# Patient Record
Sex: MALE | Race: WHITE | Employment: PART TIME | URBAN - METROPOLITAN AREA
[De-identification: names, ages, dates, MRNs, and addresses within clinical notes are randomized per-mention and may not be internally consistent; named-entity substitution may affect disease eponyms.]

---
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
## 2021-11-27 ENCOUNTER — HOSPITAL ENCOUNTER (EMERGENCY)
Age: 21
Discharge: HOME | End: 2021-11-27
Payer: MEDICARE

## 2021-11-27 VITALS
HEIGHT: 72 IN | WEIGHT: 223 LBS | TEMPERATURE: 97.9 F | OXYGEN SATURATION: 99 % | SYSTOLIC BLOOD PRESSURE: 135 MMHG | RESPIRATION RATE: 18 BRPM | DIASTOLIC BLOOD PRESSURE: 78 MMHG | BODY MASS INDEX: 30.2 KG/M2 | HEART RATE: 80 BPM

## 2021-11-27 DIAGNOSIS — L05.91 PILONIDAL CYST: Primary | ICD-10-CM

## 2021-11-27 PROCEDURE — 10080 I&D PILONIDAL CYST SIMPLE: CPT

## 2021-11-27 PROCEDURE — 6360000002 HC RX W HCPCS: Performed by: NURSE PRACTITIONER

## 2021-11-27 PROCEDURE — 90471 IMMUNIZATION ADMIN: CPT | Performed by: NURSE PRACTITIONER

## 2021-11-27 PROCEDURE — 90715 TDAP VACCINE 7 YRS/> IM: CPT | Performed by: NURSE PRACTITIONER

## 2021-11-27 PROCEDURE — 99283 EMERGENCY DEPT VISIT LOW MDM: CPT

## 2021-11-27 PROCEDURE — 6370000000 HC RX 637 (ALT 250 FOR IP): Performed by: NURSE PRACTITIONER

## 2021-11-27 RX ORDER — DOXYCYCLINE HYCLATE 100 MG
100 TABLET ORAL 2 TIMES DAILY
Qty: 14 TABLET | Refills: 0 | Status: SHIPPED | OUTPATIENT
Start: 2021-11-27 | End: 2021-12-04

## 2021-11-27 RX ORDER — DOXYCYCLINE HYCLATE 100 MG
100 TABLET ORAL ONCE
Status: COMPLETED | OUTPATIENT
Start: 2021-11-27 | End: 2021-11-27

## 2021-11-27 RX ADMIN — DOXYCYCLINE HYCLATE 100 MG: 100 TABLET, COATED ORAL at 23:07

## 2021-11-27 RX ADMIN — TETANUS TOXOID, REDUCED DIPHTHERIA TOXOID AND ACELLULAR PERTUSSIS VACCINE, ADSORBED 0.5 ML: 5; 2.5; 8; 8; 2.5 SUSPENSION INTRAMUSCULAR at 23:07

## 2021-11-27 ASSESSMENT — PAIN DESCRIPTION - DESCRIPTORS: DESCRIPTORS: PRESSURE;SHARP

## 2021-11-27 ASSESSMENT — ENCOUNTER SYMPTOMS
ABDOMINAL PAIN: 0
SORE THROAT: 0
COLOR CHANGE: 0
RHINORRHEA: 0
SHORTNESS OF BREATH: 0

## 2021-11-27 ASSESSMENT — PAIN DESCRIPTION - LOCATION: LOCATION: RECTUM

## 2021-11-27 ASSESSMENT — PAIN SCALES - GENERAL: PAINLEVEL_OUTOF10: 5

## 2021-11-28 NOTE — ED PROVIDER NOTES
reviewed and negative. PAST MEDICAL HISTORY     Past Medical History:   Diagnosis Date    ADHD (attention deficit hyperactivity disorder)     Pervasive developmental disorder          SURGICAL HISTORY     No past surgical history on file. CURRENT MEDICATIONS       Previous Medications    No medications on file         ALLERGIES     Patient has no known allergies. FAMILY HISTORY     No family history on file. SOCIAL HISTORY       Social History     Socioeconomic History    Marital status: Single     Spouse name: Not on file    Number of children: Not on file    Years of education: Not on file    Highest education level: Not on file   Occupational History    Not on file   Tobacco Use    Smoking status: Never Smoker    Smokeless tobacco: Never Used   Vaping Use    Vaping Use: Never used   Substance and Sexual Activity    Alcohol use: No    Drug use: Yes    Sexual activity: Not Currently   Other Topics Concern    Not on file   Social History Narrative    Not on file     Social Determinants of Health     Financial Resource Strain:     Difficulty of Paying Living Expenses: Not on file   Food Insecurity:     Worried About Running Out of Food in the Last Year: Not on file    Jj of Food in the Last Year: Not on file   Transportation Needs:     Lack of Transportation (Medical): Not on file    Lack of Transportation (Non-Medical):  Not on file   Physical Activity:     Days of Exercise per Week: Not on file    Minutes of Exercise per Session: Not on file   Stress:     Feeling of Stress : Not on file   Social Connections:     Frequency of Communication with Friends and Family: Not on file    Frequency of Social Gatherings with Friends and Family: Not on file    Attends Hinduism Services: Not on file    Active Member of Clubs or Organizations: Not on file    Attends Club or Organization Meetings: Not on file    Marital Status: Not on file   Intimate Partner Violence:     Fear follow-up with general surgery. He was encouraged to return to the ED for worsening symptoms. He was also encouraged to follow-up with his primary care doctor in the next few days. Patient was ultimately discharged with all questions answered. The patient tolerated their visit well. I have evaluated this patient. My supervising physician was available for consultation. The patient and / or the family were informed of the results of any tests, a time was given to answer questions, a plan was proposed and they agreed with plan. FINAL IMPRESSION      1.  Pilonidal cyst          DISPOSITION/PLAN   DISPOSITION Discharge - Pending Orders Complete 11/27/2021 10:27:47 PM      PATIENT REFERRED TO:  Tyrese Lugo MD  [address] [facility] [address] [place] [address],[address]  [phone]    Schedule an appointment as soon as possible for a visit in 2 days  for re-evaluation    [facility] ED  [address] [facility]  [phone]    If symptoms worsen    Ross Greer MD  [address] [facility] [address] [place] [address] [place] [address] [id number]    Schedule an appointment as soon as possible for a visit in 1 week  for re-evaluation      DISCHARGE MEDICATIONS:  New Prescriptions    DOXYCYCLINE HYCLATE (VIBRA-TABS) 100 MG TABLET    Take 1 tablet by mouth 2 times daily for 7 days       DISCONTINUED MEDICATIONS:  Discontinued Medications    No medications on file              (Please note that portions of this note were completed with a voice recognition program.  Efforts were made to edit the dictations but occasionally words are mis-transcribed.)    CAM García CNP (electronically signed)         CAM García CNP  11/27/21 9022

## 2022-02-14 ENCOUNTER — HOSPITAL ENCOUNTER (INPATIENT)
Age: 22
LOS: 1 days | Discharge: PSYCHIATRIC OR SUBSTANCE USE FACILITY | DRG: 918 | End: 2022-02-16
Attending: STUDENT IN AN ORGANIZED HEALTH CARE EDUCATION/TRAINING PROGRAM | Admitting: INTERNAL MEDICINE
Payer: MEDICARE

## 2022-02-14 DIAGNOSIS — T43.292A BUPROPION OVERDOSE, INTENTIONAL SELF-HARM, INITIAL ENCOUNTER (HCC): Primary | ICD-10-CM

## 2022-02-14 DIAGNOSIS — R45.851 SUICIDAL IDEATION: ICD-10-CM

## 2022-02-14 DIAGNOSIS — R56.9 SEIZURE (HCC): ICD-10-CM

## 2022-02-14 LAB
A/G RATIO: 1.8 (ref 1.1–2.2)
ACETAMINOPHEN LEVEL: <5 UG/ML (ref 10–30)
ALBUMIN SERPL-MCNC: 4.6 G/DL (ref 3.4–5)
ALP BLD-CCNC: 93 U/L (ref 40–129)
ALT SERPL-CCNC: 26 U/L (ref 10–40)
ANION GAP SERPL CALCULATED.3IONS-SCNC: 12 MMOL/L (ref 3–16)
AST SERPL-CCNC: 16 U/L (ref 15–37)
BASOPHILS ABSOLUTE: 0.2 K/UL (ref 0–0.2)
BASOPHILS RELATIVE PERCENT: 1.2 %
BILIRUB SERPL-MCNC: 0.9 MG/DL (ref 0–1)
BUN BLDV-MCNC: 11 MG/DL (ref 7–20)
CALCIUM SERPL-MCNC: 9 MG/DL (ref 8.3–10.6)
CHLORIDE BLD-SCNC: 103 MMOL/L (ref 99–110)
CO2: 22 MMOL/L (ref 21–32)
CREAT SERPL-MCNC: 0.9 MG/DL (ref 0.9–1.3)
EOSINOPHILS ABSOLUTE: 0.1 K/UL (ref 0–0.6)
EOSINOPHILS RELATIVE PERCENT: 1.1 %
ETHANOL: NORMAL MG/DL (ref 0–0.08)
GFR AFRICAN AMERICAN: >60
GFR NON-AFRICAN AMERICAN: >60
GLUCOSE BLD-MCNC: 136 MG/DL (ref 70–99)
HCT VFR BLD CALC: 46.5 % (ref 40.5–52.5)
HEMOGLOBIN: 15.9 G/DL (ref 13.5–17.5)
LYMPHOCYTES ABSOLUTE: 3.8 K/UL (ref 1–5.1)
LYMPHOCYTES RELATIVE PERCENT: 30.5 %
MCH RBC QN AUTO: 30.2 PG (ref 26–34)
MCHC RBC AUTO-ENTMCNC: 34.3 G/DL (ref 31–36)
MCV RBC AUTO: 88.2 FL (ref 80–100)
MONOCYTES ABSOLUTE: 0.8 K/UL (ref 0–1.3)
MONOCYTES RELATIVE PERCENT: 6.2 %
NEUTROPHILS ABSOLUTE: 7.6 K/UL (ref 1.7–7.7)
NEUTROPHILS RELATIVE PERCENT: 61 %
PDW BLD-RTO: 13.4 % (ref 12.4–15.4)
PLATELET # BLD: 270 K/UL (ref 135–450)
PMV BLD AUTO: 8.3 FL (ref 5–10.5)
POTASSIUM REFLEX MAGNESIUM: 3.7 MMOL/L (ref 3.5–5.1)
RBC # BLD: 5.27 M/UL (ref 4.2–5.9)
REASON FOR REJECTION: NORMAL
REJECTED TEST: NORMAL
SALICYLATE, SERUM: <0.3 MG/DL (ref 15–30)
SODIUM BLD-SCNC: 137 MMOL/L (ref 136–145)
TOTAL PROTEIN: 7.2 G/DL (ref 6.4–8.2)
WBC # BLD: 12.4 K/UL (ref 4–11)

## 2022-02-14 PROCEDURE — 2500000003 HC RX 250 WO HCPCS: Performed by: STUDENT IN AN ORGANIZED HEALTH CARE EDUCATION/TRAINING PROGRAM

## 2022-02-14 PROCEDURE — 96374 THER/PROPH/DIAG INJ IV PUSH: CPT

## 2022-02-14 PROCEDURE — 80179 DRUG ASSAY SALICYLATE: CPT

## 2022-02-14 PROCEDURE — 2580000003 HC RX 258: Performed by: STUDENT IN AN ORGANIZED HEALTH CARE EDUCATION/TRAINING PROGRAM

## 2022-02-14 PROCEDURE — 96361 HYDRATE IV INFUSION ADD-ON: CPT

## 2022-02-14 PROCEDURE — 82077 ASSAY SPEC XCP UR&BREATH IA: CPT

## 2022-02-14 PROCEDURE — 80143 DRUG ASSAY ACETAMINOPHEN: CPT

## 2022-02-14 PROCEDURE — 99283 EMERGENCY DEPT VISIT LOW MDM: CPT

## 2022-02-14 PROCEDURE — 6360000002 HC RX W HCPCS: Performed by: STUDENT IN AN ORGANIZED HEALTH CARE EDUCATION/TRAINING PROGRAM

## 2022-02-14 PROCEDURE — 36415 COLL VENOUS BLD VENIPUNCTURE: CPT

## 2022-02-14 PROCEDURE — 93005 ELECTROCARDIOGRAM TRACING: CPT | Performed by: STUDENT IN AN ORGANIZED HEALTH CARE EDUCATION/TRAINING PROGRAM

## 2022-02-14 PROCEDURE — 85025 COMPLETE CBC W/AUTO DIFF WBC: CPT

## 2022-02-14 PROCEDURE — 6360000002 HC RX W HCPCS

## 2022-02-14 PROCEDURE — 80053 COMPREHEN METABOLIC PANEL: CPT

## 2022-02-14 PROCEDURE — 96375 TX/PRO/DX INJ NEW DRUG ADDON: CPT

## 2022-02-14 RX ORDER — ONDANSETRON 2 MG/ML
4 INJECTION INTRAMUSCULAR; INTRAVENOUS ONCE
Status: COMPLETED | OUTPATIENT
Start: 2022-02-14 | End: 2022-02-14

## 2022-02-14 RX ORDER — 0.9 % SODIUM CHLORIDE 0.9 %
1000 INTRAVENOUS SOLUTION INTRAVENOUS ONCE
Status: COMPLETED | OUTPATIENT
Start: 2022-02-14 | End: 2022-02-14

## 2022-02-14 RX ORDER — LORAZEPAM 2 MG/ML
INJECTION INTRAMUSCULAR
Status: COMPLETED
Start: 2022-02-14 | End: 2022-02-14

## 2022-02-14 RX ADMIN — ONDANSETRON HYDROCHLORIDE 4 MG: 2 INJECTION, SOLUTION INTRAMUSCULAR; INTRAVENOUS at 21:34

## 2022-02-14 RX ADMIN — LORAZEPAM 2 MG: 2 INJECTION INTRAMUSCULAR; INTRAVENOUS at 21:34

## 2022-02-14 RX ADMIN — SODIUM BICARBONATE 25 MEQ: 84 INJECTION INTRAVENOUS at 23:07

## 2022-02-14 RX ADMIN — SODIUM CHLORIDE 1000 ML: 9 INJECTION, SOLUTION INTRAVENOUS at 21:36

## 2022-02-15 PROBLEM — T50.912A DRUG OVERDOSE, MULTIPLE DRUGS, INTENTIONAL SELF-HARM, INITIAL ENCOUNTER (HCC): Status: ACTIVE | Noted: 2022-02-15

## 2022-02-15 LAB
A/G RATIO: 1.8 (ref 1.1–2.2)
ALBUMIN SERPL-MCNC: 4.2 G/DL (ref 3.4–5)
ALP BLD-CCNC: 89 U/L (ref 40–129)
ALT SERPL-CCNC: 21 U/L (ref 10–40)
ANION GAP SERPL CALCULATED.3IONS-SCNC: 11 MMOL/L (ref 3–16)
AST SERPL-CCNC: 12 U/L (ref 15–37)
BASOPHILS ABSOLUTE: 0.1 K/UL (ref 0–0.2)
BASOPHILS RELATIVE PERCENT: 1.1 %
BILIRUB SERPL-MCNC: 1 MG/DL (ref 0–1)
BUN BLDV-MCNC: 10 MG/DL (ref 7–20)
CALCIUM SERPL-MCNC: 8.6 MG/DL (ref 8.3–10.6)
CHLORIDE BLD-SCNC: 104 MMOL/L (ref 99–110)
CO2: 23 MMOL/L (ref 21–32)
CREAT SERPL-MCNC: 0.8 MG/DL (ref 0.9–1.3)
EKG ATRIAL RATE: 99 BPM
EKG DIAGNOSIS: NORMAL
EKG P AXIS: 74 DEGREES
EKG P-R INTERVAL: 140 MS
EKG Q-T INTERVAL: 366 MS
EKG QRS DURATION: 102 MS
EKG QTC CALCULATION (BAZETT): 469 MS
EKG R AXIS: 54 DEGREES
EKG T AXIS: 23 DEGREES
EKG VENTRICULAR RATE: 99 BPM
EOSINOPHILS ABSOLUTE: 0 K/UL (ref 0–0.6)
EOSINOPHILS RELATIVE PERCENT: 0.3 %
GFR AFRICAN AMERICAN: >60
GFR NON-AFRICAN AMERICAN: >60
GLUCOSE BLD-MCNC: 106 MG/DL (ref 70–99)
HCT VFR BLD CALC: 43.2 % (ref 40.5–52.5)
HEMOGLOBIN: 14.6 G/DL (ref 13.5–17.5)
INFLUENZA A: NOT DETECTED
INFLUENZA B: NOT DETECTED
LYMPHOCYTES ABSOLUTE: 2.3 K/UL (ref 1–5.1)
LYMPHOCYTES RELATIVE PERCENT: 18.6 %
MCH RBC QN AUTO: 29.8 PG (ref 26–34)
MCHC RBC AUTO-ENTMCNC: 33.8 G/DL (ref 31–36)
MCV RBC AUTO: 88.3 FL (ref 80–100)
MONOCYTES ABSOLUTE: 0.8 K/UL (ref 0–1.3)
MONOCYTES RELATIVE PERCENT: 6.2 %
NEUTROPHILS ABSOLUTE: 9.2 K/UL (ref 1.7–7.7)
NEUTROPHILS RELATIVE PERCENT: 73.8 %
PDW BLD-RTO: 13.6 % (ref 12.4–15.4)
PLATELET # BLD: 218 K/UL (ref 135–450)
PMV BLD AUTO: 8.4 FL (ref 5–10.5)
POTASSIUM REFLEX MAGNESIUM: 3.6 MMOL/L (ref 3.5–5.1)
RBC # BLD: 4.89 M/UL (ref 4.2–5.9)
SARS-COV-2 RNA, RT PCR: NOT DETECTED
SODIUM BLD-SCNC: 138 MMOL/L (ref 136–145)
TOTAL PROTEIN: 6.6 G/DL (ref 6.4–8.2)
WBC # BLD: 12.4 K/UL (ref 4–11)

## 2022-02-15 PROCEDURE — 36415 COLL VENOUS BLD VENIPUNCTURE: CPT

## 2022-02-15 PROCEDURE — 2580000003 HC RX 258: Performed by: INTERNAL MEDICINE

## 2022-02-15 PROCEDURE — 6360000002 HC RX W HCPCS: Performed by: EMERGENCY MEDICINE

## 2022-02-15 PROCEDURE — 93010 ELECTROCARDIOGRAM REPORT: CPT | Performed by: INTERNAL MEDICINE

## 2022-02-15 PROCEDURE — 2060000000 HC ICU INTERMEDIATE R&B

## 2022-02-15 PROCEDURE — 85025 COMPLETE CBC W/AUTO DIFF WBC: CPT

## 2022-02-15 PROCEDURE — 99231 SBSQ HOSP IP/OBS SF/LOW 25: CPT

## 2022-02-15 PROCEDURE — 2580000003 HC RX 258: Performed by: EMERGENCY MEDICINE

## 2022-02-15 PROCEDURE — 87636 SARSCOV2 & INF A&B AMP PRB: CPT

## 2022-02-15 PROCEDURE — 80053 COMPREHEN METABOLIC PANEL: CPT

## 2022-02-15 PROCEDURE — 6360000002 HC RX W HCPCS: Performed by: INTERNAL MEDICINE

## 2022-02-15 PROCEDURE — 96374 THER/PROPH/DIAG INJ IV PUSH: CPT

## 2022-02-15 RX ORDER — SODIUM CHLORIDE 0.9 % (FLUSH) 0.9 %
5-40 SYRINGE (ML) INJECTION EVERY 12 HOURS SCHEDULED
Status: DISCONTINUED | OUTPATIENT
Start: 2022-02-15 | End: 2022-02-16 | Stop reason: HOSPADM

## 2022-02-15 RX ORDER — ONDANSETRON 4 MG/1
4 TABLET, ORALLY DISINTEGRATING ORAL EVERY 8 HOURS PRN
Status: DISCONTINUED | OUTPATIENT
Start: 2022-02-15 | End: 2022-02-16 | Stop reason: HOSPADM

## 2022-02-15 RX ORDER — ACETAMINOPHEN 325 MG/1
650 TABLET ORAL EVERY 6 HOURS PRN
Status: DISCONTINUED | OUTPATIENT
Start: 2022-02-15 | End: 2022-02-16 | Stop reason: HOSPADM

## 2022-02-15 RX ORDER — ACETAMINOPHEN 650 MG/1
650 SUPPOSITORY RECTAL EVERY 6 HOURS PRN
Status: DISCONTINUED | OUTPATIENT
Start: 2022-02-15 | End: 2022-02-16 | Stop reason: HOSPADM

## 2022-02-15 RX ORDER — PROCHLORPERAZINE EDISYLATE 5 MG/ML
10 INJECTION INTRAMUSCULAR; INTRAVENOUS EVERY 6 HOURS PRN
Status: DISCONTINUED | OUTPATIENT
Start: 2022-02-15 | End: 2022-02-16 | Stop reason: HOSPADM

## 2022-02-15 RX ORDER — SODIUM CHLORIDE 9 MG/ML
25 INJECTION, SOLUTION INTRAVENOUS PRN
Status: DISCONTINUED | OUTPATIENT
Start: 2022-02-15 | End: 2022-02-16 | Stop reason: HOSPADM

## 2022-02-15 RX ORDER — MAGNESIUM SULFATE IN WATER 40 MG/ML
2000 INJECTION, SOLUTION INTRAVENOUS PRN
Status: DISCONTINUED | OUTPATIENT
Start: 2022-02-15 | End: 2022-02-16 | Stop reason: HOSPADM

## 2022-02-15 RX ORDER — LORAZEPAM 2 MG/ML
2 INJECTION INTRAMUSCULAR
Status: ACTIVE | OUTPATIENT
Start: 2022-02-15 | End: 2022-02-15

## 2022-02-15 RX ORDER — SODIUM CHLORIDE 0.9 % (FLUSH) 0.9 %
5-40 SYRINGE (ML) INJECTION PRN
Status: DISCONTINUED | OUTPATIENT
Start: 2022-02-15 | End: 2022-02-16 | Stop reason: HOSPADM

## 2022-02-15 RX ORDER — SODIUM CHLORIDE 9 MG/ML
INJECTION, SOLUTION INTRAVENOUS CONTINUOUS
Status: DISCONTINUED | OUTPATIENT
Start: 2022-02-15 | End: 2022-02-16

## 2022-02-15 RX ORDER — SODIUM CHLORIDE 9 MG/ML
1000 INJECTION, SOLUTION INTRAVENOUS CONTINUOUS
Status: DISCONTINUED | OUTPATIENT
Start: 2022-02-15 | End: 2022-02-15

## 2022-02-15 RX ORDER — POTASSIUM CHLORIDE 7.45 MG/ML
10 INJECTION INTRAVENOUS PRN
Status: DISCONTINUED | OUTPATIENT
Start: 2022-02-15 | End: 2022-02-16 | Stop reason: HOSPADM

## 2022-02-15 RX ORDER — ONDANSETRON 2 MG/ML
4 INJECTION INTRAMUSCULAR; INTRAVENOUS EVERY 6 HOURS PRN
Status: DISCONTINUED | OUTPATIENT
Start: 2022-02-15 | End: 2022-02-16 | Stop reason: HOSPADM

## 2022-02-15 RX ORDER — ONDANSETRON 2 MG/ML
4 INJECTION INTRAMUSCULAR; INTRAVENOUS
Status: DISCONTINUED | OUTPATIENT
Start: 2022-02-15 | End: 2022-02-15

## 2022-02-15 RX ORDER — POLYETHYLENE GLYCOL 3350 17 G/17G
17 POWDER, FOR SOLUTION ORAL DAILY PRN
Status: DISCONTINUED | OUTPATIENT
Start: 2022-02-15 | End: 2022-02-16 | Stop reason: HOSPADM

## 2022-02-15 RX ADMIN — SODIUM CHLORIDE: 9 INJECTION, SOLUTION INTRAVENOUS at 07:32

## 2022-02-15 RX ADMIN — SODIUM CHLORIDE 1000 ML: 9 INJECTION, SOLUTION INTRAVENOUS at 03:01

## 2022-02-15 RX ADMIN — ENOXAPARIN SODIUM 40 MG: 100 INJECTION SUBCUTANEOUS at 09:13

## 2022-02-15 RX ADMIN — ONDANSETRON HYDROCHLORIDE 4 MG: 2 INJECTION, SOLUTION INTRAMUSCULAR; INTRAVENOUS at 03:02

## 2022-02-15 RX ADMIN — SODIUM CHLORIDE: 9 INJECTION, SOLUTION INTRAVENOUS at 23:17

## 2022-02-15 ASSESSMENT — PAIN SCALES - WONG BAKER: WONGBAKER_NUMERICALRESPONSE: 0

## 2022-02-15 ASSESSMENT — PAIN SCALES - GENERAL: PAINLEVEL_OUTOF10: 0

## 2022-02-15 NOTE — ED PROVIDER NOTES
[address] ED      CHIEF COMPLAINT  Suicidal ideation with intentional overdose    HISTORY OF PRESENT ILLNESS  Ruth Pedroza is a 24 y.o. male with a past medical history of ADHD and developmental disorder who presents to the ED complaining of intentional overdose. Ingestion at approximately 8 PM.    Suicidal ideation: Yes  Plan: Patient took a handful of Wellbutrin and doxycycline. He states he probably took 10 to 15 pills. He does not know the distribution of each type of pill. Both pills are 100 mg. He also reports that he has been prescribed Abilify but he no longer has that prescription. He denies any other tablets or other ingestion. Previous attempts: Denies  Homicidal ideation: Denies  Access to firearms: Denies  Audiovisual hallucinations: Denies  Psychiatric medications: Wellbutrin, also states he used to be on Abilify  Tobacco use: Yes  Alcohol use: Denies  Illicit drug use: Marijuana    Somatic complaints: Patient reports that his limbs feel heavy, otherwise no current complaints. Old records reviewed: Patient has been seen in the past for cutting and suicidal ideation    No other complaints, modifying factors or associated symptoms. I have reviewed the following from the nursing documentation. Past Medical History:   Diagnosis Date    ADHD (attention deficit hyperactivity disorder)     Pervasive developmental disorder      History reviewed. No pertinent surgical history. History reviewed. No pertinent family history. Social History     Socioeconomic History    Marital status: Single     Spouse name: Not on file    Number of children: Not on file    Years of education: Not on file    Highest education level: Not on file   Occupational History    Not on file   Tobacco Use    Smoking status: Never Smoker    Smokeless tobacco: Never Used   Vaping Use    Vaping Use: Never used   Substance and Sexual Activity    Alcohol use: No    Drug use:  Yes    Sexual activity: Not Currently   Other Topics Concern    Not on file   Social History Narrative    Not on file     Social Determinants of Health     Financial Resource Strain:     Difficulty of Paying Living Expenses: Not on file   Food Insecurity:     Worried About Running Out of Food in the Last Year: Not on file    Jj of Food in the Last Year: Not on file   Transportation Needs:     Lack of Transportation (Medical): Not on file    Lack of Transportation (Non-Medical): Not on file   Physical Activity:     Days of Exercise per Week: Not on file    Minutes of Exercise per Session: Not on file   Stress:     Feeling of Stress : Not on file   Social Connections:     Frequency of Communication with Friends and Family: Not on file    Frequency of Social Gatherings with Friends and Family: Not on file    Attends Baptist Services: Not on file    Active Member of [address] [place] [address] [employer] or Organizations: Not on file    Attends Club or Organization Meetings: Not on file    Marital Status: Not on file   Intimate Partner Violence:     Fear of Current or Ex-Partner: Not on file    Emotionally Abused: Not on file    Physically Abused: Not on file    Sexually Abused: Not on file   Housing Stability:     Unable to Pay for Housing in the Last Year: Not on file    Number of [place] in the Last Year: Not on file    Unstable Housing in the Last Year: Not on file     No current facility-administered medications for this encounter. No current outpatient medications on file. No Known Allergies    REVIEW OF SYSTEMS  All systems reviewed, pertinent positives per HPI otherwise noted to be negative. PHYSICAL EXAM  BP (!) 107/92   Pulse 100   Temp 98.5 °F (36.9 °C) (Oral)   Resp 24   Ht 6' (1.829 m)   Wt 223 lb (101.2 kg)   SpO2 93%   BMI 30.24 kg/m²    GENERAL APPEARANCE: Awake and alert. Cooperative. no distress. HENT: Normocephalic. Atraumatic. Mucous membranes are moist  NECK: Supple.   Full range of motion of the neck without stiffness or pain. EYES: PERRL. EOM's grossly intact. HEART/CHEST: RRR. No murmurs. LUNGS: Respirations unlabored. CTAB. Good air exchange. Speaking comfortably in full sentences. ABDOMEN: No tenderness. Soft. Non-distended. No masses. No organomegaly. No guarding or rebound. MUSCULOSKELETAL: No extremity edema. Compartments soft. No deformity. No tenderness in the extremities. All extremities neurovascularly intact. SKIN: Warm and dry. No acute rashes. NEUROLOGICAL: Alert and oriented x3. No gross facial drooping. Strength 5/5, sensation intact. No ataxia. PSYCHIATRIC: Reports suicidal ideation. Does not appear to be responding to internal stimuli. LABS  I have reviewed all labs for this visit.    Results for orders placed or performed during the hospital encounter of 02/14/22   CBC Auto Differential   Result Value Ref Range    WBC 12.4 (H) 4.0 - 11.0 K/uL    RBC 5.27 4.20 - 5.90 M/uL    Hemoglobin 15.9 13.5 - 17.5 g/dL    Hematocrit 46.5 40.5 - 52.5 %    MCV 88.2 80.0 - 100.0 fL    MCH 30.2 26.0 - 34.0 pg    MCHC 34.3 31.0 - 36.0 g/dL    RDW 13.4 12.4 - 15.4 %    Platelets 568 384 - 457 K/uL    MPV 8.3 5.0 - 10.5 fL    Neutrophils % 61.0 %    Lymphocytes % 30.5 %    Monocytes % 6.2 %    Eosinophils % 1.1 %    Basophils % 1.2 %    Neutrophils Absolute 7.6 1.7 - 7.7 K/uL    Lymphocytes Absolute 3.8 1.0 - 5.1 K/uL    Monocytes Absolute 0.8 0.0 - 1.3 K/uL    Eosinophils Absolute 0.1 0.0 - 0.6 K/uL    Basophils Absolute 0.2 0.0 - 0.2 K/uL   SPECIMEN REJECTION   Result Value Ref Range    Rejected Test CMP ACETM SALIC     Reason for Rejection see below    Comprehensive Metabolic Panel w/ Reflex to MG   Result Value Ref Range    Sodium 137 136 - 145 mmol/L    Potassium reflex Magnesium 3.7 3.5 - 5.1 mmol/L    Chloride 103 99 - 110 mmol/L    CO2 22 21 - 32 mmol/L    Anion Gap 12 3 - 16    Glucose 136 (H) 70 - 99 mg/dL    BUN 11 7 - 20 mg/dL    CREATININE 0.9 0.9 - 1.3 mg/dL    GFR Non- American >60 >60    GFR African American >60 >60    Calcium 9.0 8.3 - 10.6 mg/dL    Total Protein 7.2 6.4 - 8.2 g/dL    Albumin 4.6 3.4 - 5.0 g/dL    Albumin/Globulin Ratio 1.8 1.1 - 2.2    Total Bilirubin 0.9 0.0 - 1.0 mg/dL    Alkaline Phosphatase 93 40 - 129 U/L    ALT 26 10 - 40 U/L    AST 16 15 - 37 U/L   Ethanol   Result Value Ref Range    Ethanol Lvl None Detected mg/dL   Acetaminophen Level   Result Value Ref Range    Acetaminophen Level <5 (L) 10 - 30 ug/mL   Salicylate   Result Value Ref Range    Salicylate, Serum <7.2 (L) 15.0 - 30.0 mg/dL   EKG 12 Lead   Result Value Ref Range    Ventricular Rate 99 BPM    Atrial Rate 99 BPM    P-R Interval 140 ms    QRS Duration 102 ms    Q-T Interval 366 ms    QTc Calculation (Bazett) 469 ms    P Axis 74 degrees    R Axis 54 degrees    T Axis 23 degrees    Diagnosis       Normal sinus rhythmNormal ECGWhen compared with ECG of 14-FEB-2022 22:20, (unconfirmed)No significant change was found           ED COURSE / MDM  Patient seen and evaluated. Old records reviewed. Labs and imaging reviewed and results discussed with patient. Overall, patient in acute distress, presenting for intentional overdose due to suicidal ideation. History of present illness significant for intentional ingestion of doxycycline and Wellbutrin. Physical exam remarkable for intact neurologic exam.     Laboratory studies obtained for medical clearance. Work-up showed:    ED Course as of 02/15/22 0000   Mon Feb 14, 2022 2035 Spoke to poison control. As far as the doxycycline, they said that GI issues would be the only major concern. Regarding the Wellbutrin, this does place patient at risk for seizure (benzodiazepines for treatment) they also said that patient may have QRS widening (sodium bicarb for treatment). Stated that patient cannot be cleared from a Wellbutrin perspective for 24 hours. [ER]   2126 Patient had seizure- brief, followed by vomiting.  Given ativan 1mg [ER]   1609 Leukocytosis to 12. 4. No anemia or thrombocytopenia. [ER]   2300 The Ekg interpreted by me shows  normal sinus rhythm with a rate of 99  Axis is   Normal  QTc is  prolonged  Intervals and Durations are unremarkable. ST Segments: no acute change  No significant change from prior EKG dated 5/25/19  ----------  EKG shows mild QRS prolongation. Given overdose on Wellbutrin, will give a dose of bicarb and obtain repeat EKG [ER]   4731 Ethanol, salicylate, acetaminophen levels negative [ER]   2333 No significant electrolyte abnormalities or evidence of kidney dysfunction. [ER]   2333 Liver function testing unremarkable. [ER]      ED Course User Index  [ER] Rocky Beach MD         During the patient's ED course, the patient was given:  Medications   LORazepam (ATIVAN) 2 MG/ML injection (2 mg  Given 2/14/22 2134)   ondansetron (ZOFRAN) injection 4 mg (4 mg IntraVENous Given 2/14/22 2134)   0.9 % sodium chloride bolus (0 mLs IntraVENous Stopped 2/14/22 2325)   sodium bicarbonate 8.4 % injection 25 mEq (25 mEq IntraVENous Given 2/14/22 2307)      Patient requires admission for Wellbutrin overdose. Patient also has had a seizure in the emergency department, likely related to Wellbutrin overdose. At this time, do not feel the patient to be medically cleared. Per poison control, he must be monitored for for least 24 hours. CLINICAL IMPRESSION  1. Bupropion overdose, intentional self-harm, initial encounter ([facility] [address]    2. Seizure ([facility] [address]    3. Suicidal ideation        Blood pressure 123/80, pulse 100, temperature 98.5 °F (36.9 °C), temperature source Oral, resp. rate 24, height 6' (1.829 m), weight 223 lb (101.2 kg), SpO2 93 %. DISPOSITION  Tunde Agustin was Signed out to Dr. Ana Maria Castellanos in stable condition. Patient will require admission for Wellbutrin overdose. DISCLAIMER: This chart was created using Dragon dictation software.   Efforts were made by me to ensure accuracy, however some errors may be present due to limitations of this technology and occasionally words are not transcribed correctly.       Tiffanie Churchill MD  02/15/22 0001

## 2022-02-15 NOTE — PROGRESS NOTES
End of shift note. Pt was moved from ED hold to [address] overflow. A sitter continues at bedside for safety for suicide. All lines remain intact. VSS.

## 2022-02-15 NOTE — CARE COORDINATION
Chart reviewed. Plan is for pt to transfer to [facility] when medically stable. CM will not follow, please consult us if dc needs should arise.

## 2022-02-15 NOTE — CONSULTS
Psychiatric Consult  Admit Date:  2/14/2022    Consult Date:  2/15/2022   Reason for Consult: Intentional Overdose  Summary Present Illness: Alex Gamble is a 24 y.o. male with a past medical history of ADHD and developmental disorder who presents to the ED complaining of intentional overdose. Patient took a handful of Wellbutrin and doxycycline. He states he probably took 10 to 15 pills. Pt states that he lives at home with his mother, does not work currently. Pt states he has been sad because his girlfriend of one month broke up with him over text and he loved her. Pt is tearful as he spoke about the relationship. Pt states he has no other support, does not talk with his mother about personal issues. Pt states he wanted to end his life after the break up.     Psychiatric Hx: [facility], several suicide attempts through cutting an tying  A belt around his neck  Tx: Wellbutrin  Abuse History: THC, smokes everyday, denies physical or sexual abuse or perpetration,   Social Hx: lives at home with mother,  Recently quit a job doing [employer] with his neighbor because the neighbor claimed he was losing jobs d/t the pt's quality of work. education: [employer], financial no current income, legal probation for phone harassment    MSE: Mental Status Examination:  Level of consciousness:  within normal limits  Appearance:  ill-appearing, hospital attire and lying in bed well-developed, well-nourished  Behavior/Motor:  no abnormalities noted   Attitude toward examiner:  cooperative and guarded  Speech:  normal rate and normal volume  Mood:  depressed and irritable  Affect:  intense  Hallucinations: Absent  Thought processes:  slow Attention:  attention span and concentration were age appropriate  Thought content:  Suicidal Ideation:  passiveno evidence of delusions Insight: impaired insight  Judgement: impaired judgment  Fund of Knowledge: low  IQ:low Memory: intact  Cognition:  oriented to person, place, and time  Suicide:  no specific plan to harm self  Sleep: no sleep issues  Appetite: ok, states after break up he feels nauseous after eating  Inventory of strengths and weaknesses:Family support  PE: VITALS:  /85   Pulse 79   Temp 98 °F (36.7 °C) (Oral)   Resp 28   Ht 6' (1.829 m)   Wt 223 lb (101.2 kg)   SpO2 97%   BMI 30.24 kg/m²     ROS:  Reviewed ED and Med notes and agree with findings  Labs:    Admission on 02/14/2022   Component Date Value Ref Range Status    WBC 02/14/2022 12.4* 4.0 - 11.0 K/uL Final    RBC 02/14/2022 5.27  4.20 - 5.90 M/uL Final    Hemoglobin 02/14/2022 15.9  13.5 - 17.5 g/dL Final    Hematocrit 02/14/2022 46.5  40.5 - 52.5 % Final    MCV 02/14/2022 88.2  80.0 - 100.0 fL Final    MCH 02/14/2022 30.2  26.0 - 34.0 pg Final    MCHC 02/14/2022 34.3  31.0 - 36.0 g/dL Final    RDW 02/14/2022 13.4  12.4 - 15.4 % Final    Platelets 50/10/0396 270  135 - 450 K/uL Final    MPV 02/14/2022 8.3  5.0 - 10.5 fL Final    Neutrophils % 02/14/2022 61.0  % Final    Lymphocytes % 02/14/2022 30.5  % Final    Monocytes % 02/14/2022 6.2  % Final    Eosinophils % 02/14/2022 1.1  % Final    Basophils % 02/14/2022 1.2  % Final    Neutrophils Absolute 02/14/2022 7.6  1.7 - 7.7 K/uL Final    Lymphocytes Absolute 02/14/2022 3.8  1.0 - 5.1 K/uL Final    Monocytes Absolute 02/14/2022 0.8  0.0 - 1.3 K/uL Final    Eosinophils Absolute 02/14/2022 0.1  0.0 - 0.6 K/uL Final    Basophils Absolute 02/14/2022 0.2  0.0 - 0.2 K/uL Final    Ventricular Rate 02/14/2022 99  BPM Final    Atrial Rate 02/14/2022 99  BPM Final    P-R Interval 02/14/2022 140  ms Final    QRS Duration 02/14/2022 102  ms Final    Q-T Interval 02/14/2022 366  ms Final    QTc Calculation (Bazett) 02/14/2022 469  ms Final    P Axis 02/14/2022 74  degrees Final    R Axis 02/14/2022 54  degrees Final    T Axis 02/14/2022 23  degrees Final    Diagnosis 02/14/2022 Normal sinus rhythmNormal ECGWhen compared with ECG of 14-FEB-2022 22:20, (unconfirmed)No significant change was foundConfirmed by RAYO Petit MD ([id number]) on 2/15/2022 7:48:54 AM   Final    SARS-CoV-2 RNA, RT PCR 02/15/2022 NOT DETECTED  NOT DETECTED Final    Comment: Not Detected results do not preclude SARS-CoV-2 infection and  should not be used as the sole basis for patient management  decisions. Results must be combined with clinical observations,  patient history, and epidemiological information. Testing was performed using NETO MYRTLE SARS-CoV-2 and Influenza A/B  nucleic acid assay. This test is a multiplex Real-Time Reverse  Transcriptase Polymerase Chain Reaction (RT-PCR)-based in vitro  diagnostic test intended for the qualitative detection of nucleic  acids from SARS-CoV-2, influenza A, and influenza B in nasopharyngeal  and nasal swab specimens for use under the FDAs Emergency Use  Authorization (EUA) only. Patient Fact Sheet:  FindDrives.pl  Provider Fact Sheet: FindDrives.pl  EUA: FindDrives.pl  IFU: FindDrives.pl    Methodology:  RT-PCR      INFLUENZA A 02/15/2022 NOT DETECTED  NOT DETECTED Final    INFLUENZA B 02/15/2022 NOT DETECTED  NOT DETECTED Final    Rejected Test 27/62/2455 CMP [facility] [address]   Final    Reason for Rejection 02/14/2022 see below   Final    Comment: Unable to perform testing; specimen grossly hemolyzed. To perform testing the specimen will need to be recollected.  Hemolyz      Sodium 02/14/2022 137  136 - 145 mmol/L Final    Potassium reflex Magnesium 02/14/2022 3.7  3.5 - 5.1 mmol/L Final    Chloride 02/14/2022 103  99 - 110 mmol/L Final    CO2 02/14/2022 22  21 - 32 mmol/L Final    Anion Gap 02/14/2022 12  3 - 16 Final    Glucose 02/14/2022 136* 70 - 99 mg/dL Final    BUN 02/14/2022 11  7 - 20 mg/dL Final    CREATININE 02/14/2022 0.9  0.9 - 1.3 mg/dL Final    GFR Non- 02/14/2022 >60  >60 Final    Comment: >60 mL/min/1.73m2 EGFR, calc. for ages 25 and older using the  MDRD formula (not corrected for weight), is valid for stable  renal function.  GFR  02/14/2022 >60  >60 Final    Comment: Chronic Kidney Disease: less than 60 ml/min/1.73 sq.m. Kidney Failure: less than 15 ml/min/1.73 sq.m. Results valid for patients 18 years and older.       Calcium 02/14/2022 9.0  8.3 - 10.6 mg/dL Final    Total Protein 02/14/2022 7.2  6.4 - 8.2 g/dL Final    Albumin 02/14/2022 4.6  3.4 - 5.0 g/dL Final    Albumin/Globulin Ratio 02/14/2022 1.8  1.1 - 2.2 Final    Total Bilirubin 02/14/2022 0.9  0.0 - 1.0 mg/dL Final    Alkaline Phosphatase 02/14/2022 93  40 - 129 U/L Final    ALT 02/14/2022 26  10 - 40 U/L Final    AST 02/14/2022 16  15 - 37 U/L Final    Ethanol Lvl 02/14/2022 None Detected  mg/dL Final    Comment:    None Detected  Conversion factor:  100 mg/dl = .100 g/dl  For Medical Purposes Only      Acetaminophen Level 02/14/2022 <5* 10 - 30 ug/mL Final    Comment: Therapeutic Range: 10.0-30.0 ug/mL  Toxic: >=004 ug/mL      Salicylate, Serum 61/28/6302 <0.3* 15.0 - 30.0 mg/dL Final    Comment: Therapeutic Range: 15.0-30.0 mg/dL  Toxic: >30.0 mg/dL      WBC 02/15/2022 12.4* 4.0 - 11.0 K/uL Final    RBC 02/15/2022 4.89  4.20 - 5.90 M/uL Final    Hemoglobin 02/15/2022 14.6  13.5 - 17.5 g/dL Final    Hematocrit 02/15/2022 43.2  40.5 - 52.5 % Final    MCV 02/15/2022 88.3  80.0 - 100.0 fL Final    MCH 02/15/2022 29.8  26.0 - 34.0 pg Final    MCHC 02/15/2022 33.8  31.0 - 36.0 g/dL Final    RDW 02/15/2022 13.6  12.4 - 15.4 % Final    Platelets 47/89/2285 218  135 - 450 K/uL Final    MPV 02/15/2022 8.4  5.0 - 10.5 fL Final    Neutrophils % 02/15/2022 73.8  % Final    Lymphocytes % 02/15/2022 18.6  % Final    Monocytes % 02/15/2022 6.2  % Final    Eosinophils % 02/15/2022 0.3  % Final    Basophils % 02/15/2022 1.1  % Final    Neutrophils Absolute 02/15/2022 9.2* 1.7 - 7.7 K/uL Final    Lymphocytes Absolute 02/15/2022 2.3  1.0 - 5.1 K/uL Final    Monocytes Absolute 02/15/2022 0.8  0.0 - 1.3 K/uL Final    Eosinophils Absolute 02/15/2022 0.0  0.0 - 0.6 K/uL Final    Basophils Absolute 02/15/2022 0.1  0.0 - 0.2 K/uL Final    Sodium 02/15/2022 138  136 - 145 mmol/L Final    Potassium reflex Magnesium 02/15/2022 3.6  3.5 - 5.1 mmol/L Final    Chloride 02/15/2022 104  99 - 110 mmol/L Final    CO2 02/15/2022 23  21 - 32 mmol/L Final    Anion Gap 02/15/2022 11  3 - 16 Final    Glucose 02/15/2022 106* 70 - 99 mg/dL Final    BUN 02/15/2022 10  7 - 20 mg/dL Final    CREATININE 02/15/2022 0.8* 0.9 - 1.3 mg/dL Final    GFR Non- 02/15/2022 >60  >60 Final    Comment: >60 mL/min/1.73m2 EGFR, calc. for ages 25 and older using the  MDRD formula (not corrected for weight), is valid for stable  renal function.  GFR  02/15/2022 >60  >60 Final    Comment: Chronic Kidney Disease: less than 60 ml/min/1.73 sq.m. Kidney Failure: less than 15 ml/min/1.73 sq.m. Results valid for patients 18 years and older.       Calcium 02/15/2022 8.6  8.3 - 10.6 mg/dL Final    Total Protein 02/15/2022 6.6  6.4 - 8.2 g/dL Final    Albumin 02/15/2022 4.2  3.4 - 5.0 g/dL Final    Albumin/Globulin Ratio 02/15/2022 1.8  1.1 - 2.2 Final    Total Bilirubin 02/15/2022 1.0  0.0 - 1.0 mg/dL Final    Alkaline Phosphatase 02/15/2022 89  40 - 129 U/L Final    ALT 02/15/2022 21  10 - 40 U/L Final    AST 02/15/2022 12* 15 - 37 U/L Final        Impression: Depression, SI  Dx: axis I: <principal problem not specified>   Axis 2: Personality Disorder Not Otherwise Specified  [place] 3: See Medical History  Axis 4: Problems related to the social environment  Axis 5: 51-60 moderate symptoms         Active Hospital Problems    Diagnosis Date Noted    Drug overdose, multiple drugs, intentional self-harm, initial encounter ([facility] [address] Izzy Austin 02/15/2022     Recommendation: Pt states he intentionally took his medications in the hopes of ending his life after the break up with his girlfriend. Pt states he has no real support other than his mother and does not speak to her about personal issues. It is my recommendation, given pt's presentation and information that was provided, that patient transfer to the [facility] for continued psychiatric treatment once medically stable. I will continue to follow this patient during his admission.     Spent > 40 minutes evaluating and treating patient     Bradley Royal

## 2022-02-15 NOTE — ED NOTES
Bed: 14  Expected date:   Expected time:   Means of arrival:   Comments:     Carina Castaneda RN  02/14/22 1265

## 2022-02-15 NOTE — ED NOTES
Patient began vomiting; this nurse and Romeo Marcum assisted in cleaning up the patient, prior to the patient having an EKG completed.      Momo Lindquist RN  02/14/22 5400

## 2022-02-15 NOTE — H&P
Hospital Medicine History & Physical      PCP: Shady Coe DO    Date of Service: Pt seen/examined on 2/15/22 and admitted on 2/15/22 to Inpatient    Chief Complaint   Patient presents with   [facility]     On hold. Took an unknown amount of wellbutrin and doxycycline. Both scripts are old. History Of Present Illness: The patient is a 24 y.o. male with PMH below, presenting after intentional OD w/ Wellbutrin and doxycycline. Unknown qty. Sz x1 in ED, brief. Emesis after. MS nml for several hours per ED. Pt reports that he took a \"handful\" of Wellbutrin and doxycycline around 8 pm today in suicide attempt. Unknown qty or distribution. He has been feeling down recently but does not elaborate on additional details. Per EMR, he has Hx of SI and cutting. Admits to marijuana use. Past Medical History:        Diagnosis Date    ADHD (attention deficit hyperactivity disorder)     Pervasive developmental disorder        Past Surgical History:    History reviewed. No pertinent surgical history. Medications Prior to Admission:    unknown    Allergies:  Patient has no known allergies. Social History:    TOBACCO:   reports that he has never smoked. He has never used smokeless tobacco.  ETOH:   reports no history of alcohol use. Family History:  Reviewed in detail and negative for DM, Early CAD, Cancer (except as below). Positive as follows:    History reviewed. No pertinent family history. REVIEW OF SYSTEMS:   Pertinent positives/negatives as follows: intentional drug OD, SI, N/V, Sz, and as discussed in HPI, otherwise a complete ROS performed and all other systems are negative. PHYSICAL EXAM PERFORMED:  /69   Pulse 87   Temp 98.5 °F (36.9 °C) (Oral)   Resp 21   Ht 6' (1.829 m)   Wt 223 lb (101.2 kg)   SpO2 96%   BMI 30.24 kg/m²     GEN:  A&Ox3, NAD. HEENT:  NC/AT,EOMI, semi dry MM, no erythema/exudates or visible masses. CVS:  Normal S1,S2. RRR. Without M/G/R.   LUNG:   CTA-B. no wheezes, rales or rhonchi  ABD:  Soft, ND/NT, BS+ x4. Without G/R.  EXT: 2+ pulses, no c/c/e. Brisk cap refill. PSY:  Thought process intact, affect flat. PRISCILLA:  CN III-XII intact, moves all 4 spontaneously, sensory grossly intact. SKIN: Old scars on upper ext. Chart review shows recent radiographs:  No results found. EKG:    EKG 12 Lead [[id number]]    Collected: 02/14/22 2221    Updated: 02/14/22 2223     Ventricular Rate 99 BPM    Atrial Rate 99 BPM    P-R Interval 140 ms    QRS Duration 102 ms    Q-T Interval 366 ms    QTc Calculation (Bazett) 469 ms    P Axis 74 degrees    R Axis 54 degrees    T Axis 23 degrees    Diagnosis Normal sinus rhythmNormal ECGWhen compared with ECG of 14-FEB-2022 22:20, (unconfirmed)No significant change was found     CBC:  Recent Labs     02/14/22  2130   WBC 12.4*   HGB 15.9   HCT 46.5           RENAL  Recent Labs     02/14/22  2245      K 3.7      CO2 22   BUN 11   CREATININE 0.9   GLUCOSE 136*     LFT'S:  Recent Labs     02/14/22  2245   AST 16   ALT 26   BILITOT 0.9   ALKPHOS 93     PHYSICIAN CERTIFICATION  I certify that Valeria Moreno is expected to be hospitalized for 2 midnights based on the following assessment and plan:    ASSESSMENT/PLAN:  1. Intentional drug OD, Wellbutrin and doxycycline. Sz x1 in ED, likely 2/2 Wellbutrin OD, given IV Ativan. Sz and suicide precautions. PRN IV Ativan 2 mg for active Sz.  Psy c/s.   2. Seizure, as above. 3. Leukocytosis, WBC 12.4. No clear source of infection, possibly reactive. Repeat in am.    4. N/V, PRN compazine. 5. Prolonged QTc, 469 ms. Avoid QT prolonging agents as able. DVT Prophylaxis: Lx  Diet: clears  Code Status: Full Code   PT/OT Eval Status: Will order if needed and as patient condition allows  Dispo - Admit to inpatient    Yessenia Goodwin MD    Thank you Neil George DO for the opportunity to be involved in this patient's care.  If you have any questions or concerns please feel free to contact me via the [email or web address] Answering Service at [phone]. This chart was generated using the [address] [place] [address] [address] dictation system. I created this record but it may contain dictation errors given the limitations of this technology.

## 2022-02-15 NOTE — PROGRESS NOTES
Patient continues to rest sound with no complaints. Peripheral IV is dry/intact with continuous IV fluids. Sitter for suicide risk remains at bedside for safety.

## 2022-02-16 ENCOUNTER — HOSPITAL ENCOUNTER (INPATIENT)
Age: 22
LOS: 2 days | Discharge: HOME | DRG: 918 | End: 2022-02-18
Attending: PSYCHIATRY & NEUROLOGY | Admitting: PSYCHIATRY & NEUROLOGY
Payer: MEDICARE

## 2022-02-16 ENCOUNTER — APPOINTMENT (OUTPATIENT)
Dept: CT IMAGING | Age: 22
DRG: 918 | End: 2022-02-16
Payer: MEDICARE

## 2022-02-16 VITALS
OXYGEN SATURATION: 98 % | SYSTOLIC BLOOD PRESSURE: 118 MMHG | HEIGHT: 72 IN | RESPIRATION RATE: 24 BRPM | BODY MASS INDEX: 30.2 KG/M2 | TEMPERATURE: 97.9 F | WEIGHT: 223 LBS | DIASTOLIC BLOOD PRESSURE: 69 MMHG | HEART RATE: 72 BPM

## 2022-02-16 PROBLEM — F32.1 CURRENT MODERATE EPISODE OF MAJOR DEPRESSIVE DISORDER WITHOUT PRIOR EPISODE (HCC): Status: ACTIVE | Noted: 2022-02-16

## 2022-02-16 LAB — TSH SERPL DL<=0.05 MIU/L-ACNC: 0.35 UIU/ML (ref 0.27–4.2)

## 2022-02-16 PROCEDURE — 99222 1ST HOSP IP/OBS MODERATE 55: CPT

## 2022-02-16 PROCEDURE — 70450 CT HEAD/BRAIN W/O DYE: CPT

## 2022-02-16 PROCEDURE — 84443 ASSAY THYROID STIM HORMONE: CPT

## 2022-02-16 PROCEDURE — 99238 HOSP IP/OBS DSCHRG MGMT 30/<: CPT | Performed by: INTERNAL MEDICINE

## 2022-02-16 PROCEDURE — 2580000003 HC RX 258: Performed by: INTERNAL MEDICINE

## 2022-02-16 PROCEDURE — 83036 HEMOGLOBIN GLYCOSYLATED A1C: CPT

## 2022-02-16 PROCEDURE — 80061 LIPID PANEL: CPT

## 2022-02-16 PROCEDURE — 6360000002 HC RX W HCPCS: Performed by: INTERNAL MEDICINE

## 2022-02-16 PROCEDURE — 1240000000 HC EMOTIONAL WELLNESS R&B

## 2022-02-16 PROCEDURE — 36415 COLL VENOUS BLD VENIPUNCTURE: CPT

## 2022-02-16 RX ORDER — ACETAMINOPHEN 325 MG/1
650 TABLET ORAL EVERY 4 HOURS PRN
Status: DISCONTINUED | OUTPATIENT
Start: 2022-02-16 | End: 2022-02-18 | Stop reason: HOSPADM

## 2022-02-16 RX ORDER — OLANZAPINE 10 MG/1
5 INJECTION, POWDER, LYOPHILIZED, FOR SOLUTION INTRAMUSCULAR EVERY 8 HOURS PRN
Status: DISCONTINUED | OUTPATIENT
Start: 2022-02-16 | End: 2022-02-18 | Stop reason: HOSPADM

## 2022-02-16 RX ORDER — POLYETHYLENE GLYCOL 3350 17 G
2 POWDER IN PACKET (EA) ORAL
Status: DISCONTINUED | OUTPATIENT
Start: 2022-02-16 | End: 2022-02-18 | Stop reason: HOSPADM

## 2022-02-16 RX ORDER — TRAZODONE HYDROCHLORIDE 50 MG/1
50 TABLET ORAL NIGHTLY PRN
Status: DISCONTINUED | OUTPATIENT
Start: 2022-02-16 | End: 2022-02-18 | Stop reason: HOSPADM

## 2022-02-16 RX ORDER — DIPHENHYDRAMINE HYDROCHLORIDE 50 MG/ML
50 INJECTION INTRAMUSCULAR; INTRAVENOUS EVERY 4 HOURS PRN
Status: DISCONTINUED | OUTPATIENT
Start: 2022-02-16 | End: 2022-02-18 | Stop reason: HOSPADM

## 2022-02-16 RX ORDER — MAGNESIUM HYDROXIDE/ALUMINUM HYDROXICE/SIMETHICONE 120; 1200; 1200 MG/30ML; MG/30ML; MG/30ML
30 SUSPENSION ORAL EVERY 6 HOURS PRN
Status: DISCONTINUED | OUTPATIENT
Start: 2022-02-16 | End: 2022-02-18 | Stop reason: HOSPADM

## 2022-02-16 RX ORDER — IBUPROFEN 400 MG/1
400 TABLET ORAL EVERY 6 HOURS PRN
Status: DISCONTINUED | OUTPATIENT
Start: 2022-02-16 | End: 2022-02-18 | Stop reason: HOSPADM

## 2022-02-16 RX ORDER — HYDROXYZINE PAMOATE 50 MG/1
50 CAPSULE ORAL 3 TIMES DAILY PRN
Status: DISCONTINUED | OUTPATIENT
Start: 2022-02-16 | End: 2022-02-18 | Stop reason: HOSPADM

## 2022-02-16 RX ORDER — OLANZAPINE 5 MG/1
5 TABLET ORAL EVERY 8 HOURS PRN
Status: DISCONTINUED | OUTPATIENT
Start: 2022-02-16 | End: 2022-02-18 | Stop reason: HOSPADM

## 2022-02-16 RX ADMIN — Medication 10 ML: at 09:43

## 2022-02-16 RX ADMIN — ENOXAPARIN SODIUM 40 MG: 100 INJECTION SUBCUTANEOUS at 09:43

## 2022-02-16 ASSESSMENT — SLEEP AND FATIGUE QUESTIONNAIRES
DIFFICULTY ARISING: NO
DO YOU HAVE DIFFICULTY SLEEPING: YES
SLEEP PATTERN: DIFFICULTY FALLING ASLEEP;DISTURBED/INTERRUPTED SLEEP
AVERAGE NUMBER OF SLEEP HOURS: 4
AVERAGE NUMBER OF SLEEP HOURS: 2
RESTFUL SLEEP: YES
DO YOU USE A SLEEP AID: NO
DO YOU HAVE DIFFICULTY SLEEPING: YES
RESTFUL SLEEP: NO
DIFFICULTY STAYING ASLEEP: YES
DIFFICULTY ARISING: NO
DIFFICULTY FALLING ASLEEP: YES
DIFFICULTY STAYING ASLEEP: YES
SLEEP PATTERN: DIFFICULTY FALLING ASLEEP;RESTLESSNESS;EARLY AWAKENING
DIFFICULTY FALLING ASLEEP: YES

## 2022-02-16 ASSESSMENT — PAIN SCALES - WONG BAKER
WONGBAKER_NUMERICALRESPONSE: 0

## 2022-02-16 ASSESSMENT — LIFESTYLE VARIABLES
HISTORY_ALCOHOL_USE: YES
HISTORY_ALCOHOL_USE: YES

## 2022-02-16 ASSESSMENT — PAIN SCALES - GENERAL
PAINLEVEL_OUTOF10: 0

## 2022-02-16 ASSESSMENT — PATIENT HEALTH QUESTIONNAIRE - PHQ9
SUM OF ALL RESPONSES TO PHQ QUESTIONS 1-9: 11
SUM OF ALL RESPONSES TO PHQ QUESTIONS 1-9: 12

## 2022-02-16 NOTE — DISCHARGE INSTR - DIET

## 2022-02-16 NOTE — PLAN OF CARE
Problem: Suicide risk  Goal: Provide patient with safe environment  Description: Provide patient with safe environment  2/16/2022 0957 by Jhoana Joe RN  Outcome: Ongoing  2/15/2022 2023 by Pam Huynh RN  Outcome: Ongoing

## 2022-02-16 NOTE — BH NOTE
[address] [place] [address] to verify medications, they state the patient hasn't picked up any medications in over 4 months. Pt states he takes Abilify and Wellbutrin but does not know the dosages. Attempted to call pt's mother to verify, voicemail left.

## 2022-02-16 NOTE — BH NOTE
[facility]  Admission Note     Admission Type:   Admission Type: Voluntary    Reason for admission:  Reason for Admission: Suicide attempt    PATIENT STRENGTHS:  Strengths: No significant Physical Illness,Positive Support    Patient Strengths and Limitations:  Limitations: Tendency to isolate self,General negative or hopeless attitude about future/recovery,Lacks leisure interests,Apathetic / unmotivated,Difficulty problem solving/relies on others to help solve problems,Multiple barriers to leisure interests,External locus of control,Difficult relationships / poor social skills,Limited education -> difficulty reading or writing    Addictive Behavior:   Addictive Behavior  In the past 3 months, have you felt or has someone told you that you have a problem with:  : Internet Use  Do you have a history of Chemical Use?: No  Do you have a history of Alcohol Use?: Yes  Do you have a history of Street Drug Abuse?: No  Histroy of Prescripton Drug Abuse?: No    Medical Problems:   Past Medical History:   Diagnosis Date    ADHD (attention deficit hyperactivity disorder)     Pervasive developmental disorder        Status EXAM:  Status and Exam  Normal: No  Facial Expression: Avoids Gaze,Flat  Affect: Congruent  Level of Consciousness: Alert  Mood:Normal: No  Mood: Depressed  Motor Activity:Normal: Yes  Interview Behavior: Cooperative  Preception: [place] to Person,[place] to Time,[place] to Place,[place] to Situation  Attention:Normal: Yes  Thought Processes:  (WDL)  Thought Content:Normal: No  Thought Content: Poverty of Content  Hallucinations: None  Delusions: No  Memory:Normal: No  Memory: Poor Recent  Insight and Judgment: No  Insight and Judgment: Poor Judgment,Poor Insight  Present Suicidal Ideation: No  Present Homicidal Ideation: No    Tobacco Screening:  Practical Counseling, on admission, wing X, if applicable and completed (first 3 are required if patient doesn't refuse):            ( )  Recognizing danger situations (included triggers and roadblocks)                    ( )  Coping skills (new ways to manage stress, exercise, relaxation techniques, changing routine, distraction)                                                           ( )  Basic information about quitting (benefits of quitting, techniques in how to quit, available resources  ( ) Referral for counseling faxed to [facility]                                            (x ) Patient refused counseling  ( ) Patient has not smoked in the last 30 days    Metabolic Screening:    No results found for: LABA1C    No results found for: CHOL  No results found for: TRIG  No results found for: HDL  No components found for: LDLCAL  No results found for: LABVLDL      Body mass index is 30.24 kg/m². BP Readings from Last 2 Encounters:   02/16/22 129/73   02/16/22 118/69           Pt admitted with followings belongings:  Dental Appliances: None  Vision - Corrective Lenses: None  Hearing Aid: None  Jewelry: None  Body Piercings Removed: N/A  Clothing: Shirt,Jacket / coat,Footwear,Pants  Were All Patient Medications Collected?: Not Applicable  Other Valuables: Wallet     Patient's home medications were unable to be verified at this time. Patient oriented to surroundings and program expectations and copy of patient rights given. Received admission packet:  yes. Consents reviewed, signed all. Patient verbalize understanding:  yes.   Patient education on precautions: yes                   Lachelle Morin RN

## 2022-02-16 NOTE — PLAN OF CARE
Problem: Falls - Risk of:  Goal: Will remain free from falls  Description: Will remain free from falls  Outcome: Ongoing  Goal: Absence of physical injury  Description: Absence of physical injury  Outcome: Ongoing     Problem: Suicide risk  Goal: Provide patient with safe environment  Description: Provide patient with safe environment  Outcome: Ongoing   Pt resting in bed, denies pain and SOB, reviewed with pt plan of care for shift and medications, all questions answered, pt voices no concerns. Sitter at bedside.

## 2022-02-16 NOTE — FLOWSHEET NOTE
02/16/22 1427   Psychiatric History   Psychiatric history treatment Psychiatric admissions  ([facility])   Are there any medication issues? No   Support System   Support system Primary support persons   Types of Support System Mother   Current Living Situation   Home Living Adequate   Living information Lives with others  (Pt, mother, roommate)   Problems with living situation  No   Lack of basic needs No   SSDI/SSI None   Other government assistance None   Problems with environment None   Current abuse issues None   Relationship problems Yes   Relationship problems due to  Other (Comment)  (Breakup with girlfriend after dating one month prompting suicide attempt)   Medical and Self-Care Issues   Relevant medical problems None   Relevant self-care issues None   Barriers to treatment No   Family Constellation   Spouse/partner-name/age N/A   Children-names/ages N/A   Parents Colby Landa   Siblings Sister   Contact information Patient cannot remember mom's phone number   Support services Other(Comment)  ([address] is where he gets meds from.  No therapy at this time per patient.)   Childhood   Raised by Biological mother   Biological mother Colby Landa   Relevant family history None known   History of abuse No   Legal History   Legal history Yes   Current charges Yes  (Domestic Violence)   Pick-up order  No   Restraining order No   Sentence pending No   Domestic violence charges Yes   Homicidal threats or behaviors No   Duty to warn No   Probation/parole Yes  (On probation for two years)   Juvenile legal history No    Abuse Assessment   Physical Abuse Denies   Verbal Abuse Denies   Emotional abuse Denies   Financial Abuse Denies   Sexual abuse Denies   Substance Use   Use of substances  Yes   Substance 1   Substance used Alcohol   Amount/frequency/route Not very often   Age of first use 12   Last use/History 1 month   Substance 2   Substance used Marijuana   Amount/frequency/route Smoke   Age of first use 15   Last use/History Couple days ago. Motivation for SA Treatment   Stage of engagement   (Not interested)   Motivation for treatment No   Education   Education HS graduate -GED   Special education   (Had an IEP)   Work History   Currently employed No   /VA involvement None   Leisure/Activity   Present interests Play computer game, go on walks   Current daily activity Sitting on the couch watching TV   Social with friends/family Yes   Cultural and Spiritual   Spiritual concerns No   Cultural concerns No       Clinician met with patient to complete assessment and he participated throughout. Pt intentionally overdosed on 2/14 following his girlfriend breaking up with him earlier the same day. Pt has a past history of SI and self harming behaviors. Patient lives with his mother and roommate in his mother's home. Patient said his mother is his support. Patient states he has a sister but she does not live in the home. Patient stated his only leisure activity is playing [employer] Duty all day. Patient states he can agree to a safety plan and does not feel suicidal today but could not give this clinician a reason for living. Patient reports his father committed suicide when patient was an infant. Patient states he goes to [facility] for medications and is not currently in therapy with anyone. Patient states he has a current charge of Domestic Violence but did not go into detail about it. Per chart record, patient has a developmental disability and history of Intermittent explosive disorder and several admissions to [facility] as a juvenile for psychiatric reasons.     [address], REJI

## 2022-02-16 NOTE — PROGRESS NOTES
Behavioral Services  Medicare Certification Upon Admission    I certify that this patient's inpatient psychiatric hospital admission is medically necessary for:    [x] (1) Treatment which could reasonably be expected to improve this patient's condition,       [x] (2) Or for diagnostic study;     AND     [x](2) The inpatient psychiatric services are provided while the individual is under the care of a physician and are included in the individualized plan of care.     Estimated length of stay/service 2-3 days    Plan for post-hospital care continued care through [facility]    Electronically signed by CAM Lemon CNP on 2/16/2022 at 1:23 PM

## 2022-02-16 NOTE — PROGRESS NOTES
Shift assessment completed, see flow sheet. Pt is A/O sitting quietly in bed. Sitter at bedside. Pt has safety tray for breakfast.       SpO2 98%. Respirations are easy, even, and unlabored. Bilateral lung sounds clear. VSS  NSR on the monitor    PIV, WNL     All lines and monitoring devices in place. Bed in lowest position with wheels locked. No needs expressed at this time. Will continue to monitor.

## 2022-02-16 NOTE — PROGRESS NOTES
Report called to [facility] at [facility]. Pt transported with RN and security.

## 2022-02-16 NOTE — H&P
INITIAL PSYCHIATRIC HISTORY AND PHYSICAL      Patient name: Alex Osullivan  Admit date: 2/16/2022  Today's date: 2/16/2022           CC:  Intentional Overdose    HPI:   Patient seen in room on Adult Behavioral Unit. Patient is a 24 y.o. male who presents to the [facility] with a past medical history of ADHD and developmental disorder after an intentional ingestion of his Wellbutrin and Doxycycline. Patient took a handful of the medications in an attempt to end his life. Pt did have a witnessed seizure in the ED. Pt was transferred to medical until cleared to transfer to [facility]. Pt states that he lives at home with his mother, does not work currently. Pt states he has been sad because his girlfriend of one month broke up with him over text and he loved her. Pt is tearful as he spoke about the relationship. Pt states he has no other support, does not talk with his mother about personal issues. Pt states he wanted to end his life after the break up. Pt has a prior history of suicide attempts by cutting and tying a belt around his neck. Pt was admitted to [facility] after these attempts. Pt states he spends most of his time playing video games and being alone. Pt states he has no issues with sleep or eating. Pt is guarded, speaking quietly, avoiding eye contact. Pt denies any thoughts of self harm at this time. PAST PSYCHIATRIC HISTORY:  ADHD, Depression    FAMILY PSYCHIATRIC HISTORY:   No family history on file. ALLERGIES:  No Known Allergies    CURRENT MEDICATIONS:        PAST MEDICAL HISTORY:    Past Medical History:   Diagnosis Date    ADHD (attention deficit hyperactivity disorder)     Pervasive developmental disorder         PAST SURGICAL HISTORY:  No past surgical history on file.     PROBLEM LIST:  Principal Problem:    Current moderate episode of major depressive disorder without prior episode ([facility] [address]  Active Problems:    Drug overdose, multiple drugs, intentional self-harm, initial encounter [facility])    Suicidal ideation  Resolved Problems:    * No resolved hospital problems. *       SOCIAL HISTORY:  Social History     Socioeconomic History    Marital status: Single     Spouse name: Not on file    Number of children: Not on file    Years of education: Not on file    Highest education level: Not on file   Occupational History    Not on file   Tobacco Use    Smoking status: Never Smoker    Smokeless tobacco: Never Used   Vaping Use    Vaping Use: Never used   Substance and Sexual Activity    Alcohol use: No    Drug use: Yes    Sexual activity: Not Currently   Other Topics Concern    Not on file   Social History Narrative    Not on file     Social Determinants of Health     Financial Resource Strain:     Difficulty of Paying Living Expenses: Not on file   Food Insecurity:     Worried About Running Out of Food in the Last Year: Not on file    Jj of Food in the Last Year: Not on file   Transportation Needs:     Lack of Transportation (Medical): Not on file    Lack of Transportation (Non-Medical):  Not on file   Physical Activity:     Days of Exercise per Week: Not on file    Minutes of Exercise per Session: Not on file   Stress:     Feeling of Stress : Not on file   Social Connections:     Frequency of Communication with Friends and Family: Not on file    Frequency of Social Gatherings with Friends and Family: Not on file    Attends Yazdanism Services: Not on file    Active Member of [address] [place] [address] or Organizations: Not on file    Attends Club or Organization Meetings: Not on file    Marital Status: Not on file   Intimate Partner Violence:     Fear of Current or Ex-Partner: Not on file    Emotionally Abused: Not on file    Physically Abused: Not on file    Sexually Abused: Not on file   Housing Stability:     Unable to Pay for Housing in the Last Year: Not on file    Number of [place] in the Last Year: Not on file    Unstable Housing in the Last Year: Not on file OBJECTIVE:   Vitals:    02/16/22 1248   BP: 129/73   Pulse: 74   Resp: 20   Temp: 97.7 °F (36.5 °C)   SpO2: 97%       REVIEW OF SYSTEMS:   Reports no current cardiovascular, respiratory, gastrointestinal, genitourinary,   integumentary, neurological, musculoskeletal, or immunological symptoms today. PSYCHIATRIC:  See HPI above     PSYCHIATRIC EXAMINATION / MENTAL STATUS EXAM:     CONSTITUTIONAL:    Vitals:    Blood pressure 129/73, pulse 74, temperature 97.7 °F (36.5 °C), temperature source Temporal, resp. rate 20, SpO2 97 %.   General appearance:  [x]  appears age, []  appears older than stated age,     [x]  adequately dressed and groomed, [] disheveled,                []  in no acute distress, [x] appears mildly distressed,      []  Other:      MUSCULOSKELETAL:   Gait:   [] normal, [] antalgic, [] unsteady, [x] in bed, gait not evaluated   Station:  [] erect, [x] sitting, [] recumbent, [] other        Strength/tone:  [x] muscle strength and tone appear consistent with age and condition     [] atrophy      [] abnormal movements  PSYCHIATRIC:    Relatedness:  [] cooperative, [x] guarded, [] indifferent, [] hostile,      [] sedated  Speech:  [] normal prosody, [] pressured, [x] decreased volume,    [] slurred, [] halting, [] slowed, [] other     [] echolalia, [] incoherent, [] stuttering   Eye contact:  [] direct, [x] avoidant, [] intense  Kinetics:  [x] normal, [] increased, [] decreased  Mood:   [] stable, [x] depressed, [] anxious, [] irritable,     [] labile  Affect:   [] normal range, [] constricted, [x] depressed, [] anxious,     [] angry, [] blunted  Hallucinations  [x] denies, [] auditory,  [] visual,  [] olfactory, [] tactile  Delusions  [] none, [] grandiose,  [] jealous,  [] persecutory,  [] somatic,     [] bizarre  Preoccupations  [x] none, [] violence, [] obsessions, [] other     Suicidal ideation  [x] denies, [] endorses  Homicidal ideation [x] denies, [] endorses  Thought process: [] logical, [x] circumstantial, [] tangential, [] ESTHER,     [] simplistic, [] disorganized  Associations:  [x] logical and coherent, [] loosening, [] disorganized  Insight:   [] good, [] fair, [x] poor  Judgment:  [] good, [] fair, [x] poor  Attention and concentration:     [] intact, [x] limited, [] able to focus on interview,     [] grossly impaired  Orientation:  [x] person, place, time, situation     [] disoriented to:     Memory:  Remote memory [x] intact, [] impaired     Recent memory  [x] intact, [] impaired          IMPRESSION    Principal Problem:    Current moderate episode of major depressive disorder without prior episode ([facility] [address]  Active Problems:    Drug overdose, multiple drugs, intentional self-harm, initial encounter ([facility]    Suicidal ideation  Resolved Problems:    * No resolved hospital problems. *       ______      Tx plan:  Prevent self injury, stabilize affect, restore sleep, treat depression, treat anxiety, establish/maintain aftercare, increase coping mechanisms, improve medication compliance. All conditions present on admission are being treated while pt is hospitalized. Discussed PHP after discharge as part of transition back to the community. Medications  No current facility-administered medications for this encounter. PRN Meds:    Estimated length of stay: 2-3 days  Prognosis:  Fair   Criteria for Discharge:  Not suicidal, sleeping well, affect stable, depression improving, eating well, aftercare arranged. Spent > 70 minutes evaluating and treating patient, more than 50 % of that time was spent counseling the patient on their symptoms, treatment, and expected goals. ______  PLAN   1. Admit to Adult Behavioral Unit / Senior Behavioral Unit  2. Consult Internal Medicine to evaluate and treat medical conditions  3. Adjust psychotropic medications to target symptoms  4. Occupational Therapy, Physical Therapy, Group Psychotherapy as tolerated   5.   Reviewed treatment plan with patient including medication risks, benefits, side effects. Obtained informed consent for treatment.      Kacy Resendiz, PMJOSEPHINEP-BC

## 2022-02-17 LAB
CHOLESTEROL, TOTAL: 134 MG/DL (ref 0–199)
HDLC SERPL-MCNC: 25 MG/DL (ref 40–60)
LDL CHOLESTEROL CALCULATED: 77 MG/DL
TRIGL SERPL-MCNC: 159 MG/DL (ref 0–150)
VLDLC SERPL CALC-MCNC: 32 MG/DL

## 2022-02-17 PROCEDURE — 99233 SBSQ HOSP IP/OBS HIGH 50: CPT

## 2022-02-17 PROCEDURE — 1240000000 HC EMOTIONAL WELLNESS R&B

## 2022-02-17 PROCEDURE — 6370000000 HC RX 637 (ALT 250 FOR IP)

## 2022-02-17 RX ORDER — ARIPIPRAZOLE 2 MG/1
2 TABLET ORAL DAILY
Status: DISCONTINUED | OUTPATIENT
Start: 2022-02-17 | End: 2022-02-18 | Stop reason: HOSPADM

## 2022-02-17 RX ADMIN — TRAZODONE HYDROCHLORIDE 50 MG: 50 TABLET ORAL at 20:47

## 2022-02-17 RX ADMIN — ARIPIPRAZOLE 2 MG: 2 TABLET ORAL at 12:14

## 2022-02-17 NOTE — GROUP NOTE
Group Therapy Note    Date: 2/17/2022    Group Start Time: 1100  Group End Time: 6554  Group Topic: Psychoeducation    [facility]    HUNG George        Group Therapy Note  Provider introduced the patients to the CBT triangle and reframing negative thoughts to positive thoughts. They were able to build onto the previous group learning of making positive I statements with the reframing thoughts to being positive. Attendees: 10         Patient's Goal:      Notes:  Patient was cooperative and fully engaged in the group. He participated in the discussion and coping skills practice.     Status After Intervention:  Improved    Participation Level: Interactive    Participation Quality: Appropriate      Speech:  normal      Thought Process/Content: Logical      Affective Functioning: Congruent      Mood: euthymic      Level of consciousness:  Alert      Response to Learning: Able to retain information      Endings: None Reported    Modes of Intervention: Education      Discipline Responsible: /Counselor      Signature:  HUNG George

## 2022-02-17 NOTE — PROGRESS NOTES
Purposeful Rounding    Patient Location: Patient room    Patient willing to engage in conversation: Yes    Presentation/behavior: Guarded/Suspicious, Withdrawn and Resistive    Affect: Flat/blunted    Concerns reported: none     PRN medications given: no    Environmental assessment: Room free from clutter, Clear path to bathroom  and Adequate lighting    Fall prevention interventions in place: Lighting appropriate, Room free of clutter and Clear path to bathroom    Daily Bricelyn Fall Risk Score: 59    Daily Quiñonez Fall Risk Score: low      Electronically signed by Hernando Nguyen RN on 2/16/22 at 9:10 PM EST
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
## 2022-02-17 NOTE — PLAN OF CARE
Problem: Suicide risk  Goal: Provide patient with safe environment  Description: Provide patient with safe environment  Outcome: Met This Shift     Problem: Altered Mood, Depressive Behavior:  Goal: Able to verbalize acceptance of life and situations over which he or she has no control  Description: Able to verbalize acceptance of life and situations over which he or she has no control  Outcome: Ongoing  Note: When Bernadette Klein becomes overwhelmed he will sit on the floor in the shower. Cooperative coming out of the shower to do an activity.  Denies SI/HI or AVH     Problem: Altered Mood, Depressive Behavior:  Goal: Able to verbalize and/or display a decrease in depressive symptoms  Description: Able to verbalize and/or display a decrease in depressive symptoms  Outcome: Ongoing     Problem: Altered Mood, Depressive Behavior:  Goal: Ability to disclose and discuss suicidal ideas will improve  Description: Ability to disclose and discuss suicidal ideas will improve  Outcome: Met This Shift     Problem: Altered Mood, Depressive Behavior:  Goal: Able to verbalize support systems  Description: Able to verbalize support systems  Outcome: Met This Shift     Problem: Altered Mood, Depressive Behavior:  Goal: Absence of self-harm  Description: Absence of self-harm  Outcome: Met This Shift

## 2022-02-17 NOTE — GROUP NOTE
Group Therapy Note    Date: 2/17/2022    Group Start Time: 1000  Group End Time: 0549  Group Topic: Cognitive Skills    [id number] [facility]        Group Therapy Note    Attendees: 14      Group members completed an \"about me\" icebreaker. After, the group was asked to write out their name and a positive strength for every letter of their name. When finished, the members shared each with the other members of the group. Notes:  Stephanieledy Coleman attended group for the full duration. Stephanie Virginia completed the activity and quietly sat at the table with other members of the group. Status After Intervention:  Unchanged    Participation Level:  Active Listener    Participation Quality: Appropriate      Speech:  mute      Thought Process/Content: Logical      Affective Functioning: Flat and Constricted/Restricted      Mood: depressed      Level of consciousness:  Alert      Response to Learning: Able to verbalize current knowledge/experience      Endings: None Reported    Modes of Intervention: Exploration and Activity      Discipline Responsible: Behavorial Health Tech      Signature:  HUNG Monteiro

## 2022-02-17 NOTE — DISCHARGE SUMMARY
Name:  Ninfa Thompson  Room:  2569/9788-82  MRN:    [id number]    Discharge Summary      This discharge summary is in conjunction with a complete physical exam done on the day of discharge. Attending Physician: Dr. Tony Ng  Discharging Physician: Dr. Tramaine Lafleur: 2/14/2022  Discharge:  2/16/2022    HPI:    The patient is a 24 y.o. male with PMH below, presenting after intentional OD w/ Wellbutrin and doxycycline. Unknown qty. Sz x1 in ED, brief. Emesis after. MS nml for several hours per ED. Pt reports that he took a \"handful\" of Wellbutrin and doxycycline around 8 pm today in suicide attempt. Unknown qty or distribution. He has been feeling down recently but does not elaborate on additional details. Per EMR, he has Hx of SI and cutting. Admits to marijuana use. Diagnoses this Admission and Hospital Course   #Intentional drug overdose  -Wellbutrin, Doxycycline. Had 1 seizure in the ED, likely secondary to Wellbutrin Overdose. Given Ativan. Seizure and Suicide precautions, PRN Ativan. Psychiatry consulted. - patient medically cleared. D/c to [facility]    #Seizure  - secondary to Wellbutrin Overdose  - Ativan prn, seizure precautions    #Leukocytosis  - likely reactive. Repeat the same. #Nausea, vomiting  - PRN Compazine    #Prolonged QTC  - avoid QT prolonging Agents     Procedures (Please Review Full Report for Details)  N/A    Consults    Psychiatry       Physical Exam at Discharge:    /69   Pulse 72   Temp 97.9 °F (36.6 °C) (Oral)   Resp 24   Ht 6' (1.829 m)   Wt 223 lb (101.2 kg)   SpO2 98%   BMI 30.24 kg/m²     Gen: No distress. Alert. Eyes: PERRL. No sclera icterus. No conjunctival injection. ENT: No discharge. Pharynx clear. Neck: No JVD. No Carotid Bruit. Trachea midline. Resp: No accessory muscle use. No crackles. No wheezes. No rhonchi. CV: Regular rate. Regular rhythm. No murmur. No rub. No edema.    Capillary Refill: Brisk,< 3 seconds Peripheral Pulses: +2 palpable, equal bilaterally   GI: Non-tender. Non-distended. No masses. No organomegaly. Normal bowel sounds. No hernia. Skin: Warm and dry. No nodule on exposed extremities. No rash on exposed extremities. M/S: No cyanosis. No joint deformity. No clubbing. Neuro: Awake. Grossly nonfocal.  Cranial nerves II-XII intact  Psych: Oriented x 3. No anxiety or agitation    CBC: Recent Labs     02/14/22  2130 02/15/22  0725   WBC 12.4* 12.4*   HGB 15.9 14.6   HCT 46.5 43.2   MCV 88.2 88.3    218     BMP:   Recent Labs     02/14/22  2245 02/15/22  0725    138   K 3.7 3.6    104   CO2 22 23   BUN 11 10   CREATININE 0.9 0.8*     LIVER PROFILE:   Recent Labs     02/14/22  2245 02/15/22  0725   AST 16 12*   ALT 26 21   BILITOT 0.9 1.0   ALKPHOS 93 89       U/A:    Lab Results   Component Value Date    COLORU Yellow 05/25/2019    WBCUA 0-2 05/25/2019    RBCUA None seen 05/25/2019    MUCUS 4+ 05/25/2019    BACTERIA Rare 05/25/2019    CLARITYU Clear 05/25/2019    SPECGRAV 1.025 05/25/2019    LEUKOCYTESUR Negative 05/25/2019    BLOODU Negative 05/25/2019    GLUCOSEU Negative 05/25/2019    AMORPHOUS 1+ 05/25/2019         CULTURES  COVID/Influenza: Not detected    RADIOLOGY  CT HEAD WO CONTRAST   Final Result   No acute intracranial abnormality. RECOMMENDATIONS:   Unavailable               Discharge Medications     Medication List      You have not been prescribed any medications. Discharged in stable condition to [facility]. MADISON Peter.

## 2022-02-17 NOTE — PLAN OF CARE
Problem: Suicide risk  Description: Suicide risk  Goal: Provide patient with safe environment  Description: Provide patient with safe environment  Outcome: Ongoing     Problem: Altered Mood, Depressive Behavior:  Goal: Able to verbalize and/or display a decrease in depressive symptoms  Description: Able to verbalize and/or display a decrease in depressive symptoms  Outcome: Not Met This Shift     Problem: Altered Mood, Depressive Behavior:  Goal: Ability to disclose and discuss suicidal ideas will improve  Description: Ability to disclose and discuss suicidal ideas will improve  Outcome: Ongoing     Problem: Altered Mood, Depressive Behavior:  Goal: Absence of self-harm  Description: Absence of self-harm  Outcome: Ongoing   Patient is noted to be withdrawn to room and isolative. He denied SI/HI,A/V hallucinations. He does contract for safety. He came out to the nurses station for hs snack. He denied any pain this evening. Safety checks continue Q 15 minutes throughout the shift.

## 2022-02-17 NOTE — H&P
Patient from medical.  Admitted 2/14-2/16/22 for intentional drug overdose. Reviewed discharge summary from 2/16. #Intentional drug overdose  -Wellbutrin, Doxycycline. Had 1 seizure in the ED, likely secondary to Wellbutrin Overdose. Given Ativan. Seizure and Suicide precautions, PRN Ativan. Psychiatry consulted. - patient medically cleared. D/c to [facility]     #Seizure  - secondary to Wellbutrin Overdose  - Ativan prn, seizure precautions     #Leukocytosis  - likely reactive. Repeat the same.      #Nausea, vomiting  - PRN Compazine     #Prolonged QTC  - avoided QT prolonging Agents     Notify medical provider should acute medical issues arise.      Keliy Umana FNP-C  2/17/2022

## 2022-02-17 NOTE — BH NOTE
[facility]  Treatment Team Note  Review Date & Time: 2/17/22 0928    Patient was not in treatment team      Status EXAM:   Status and Exam  Normal: No  Facial Expression: Avoids Gaze,Flat,Sad  Affect: Blunt  Level of Consciousness: Alert  Mood:Normal: No  Mood: Depressed,Anxious,Helpless,Sad  Motor Activity:Normal: Yes  Interview Behavior: Cooperative  Preception: [place] to Person,[place] to Time,[place] to Place,[place] to Situation  Attention:Normal: No  Attention: Distractible,Unable to Concentrate  Thought Processes: Blocking  Thought Content:Normal: No  Thought Content: Poverty of Content  Hallucinations: None,Other (Comment) (patient denied)  Delusions: No  Memory:Normal: No  Memory: Poor Recent  Insight and Judgment: No  Insight and Judgment: Poor Judgment,Poor Insight,Unmotivated  Present Suicidal Ideation: No  Present Homicidal Ideation: No      Suicide Risk CSSR-S:  1) Within the past month, have you wished you were dead or wished you could go to sleep and not wake up? : Yes  2) Have you actually had any thoughts of killing yourself? : Yes  3) Have you been thinking about how you might kill yourself? : Yes  5) Have you started to work out or worked out the details of how to kill yourself? Do you intend to carry out this plan? : No  6) Have you ever done anything, started to do anything, or prepared to do anything to end your life?: Yes      PLAN/TREATMENT RECOMMENDATIONS UPDATE: Patient will take medication as prescribed, eat 75% of meals, attend groups, participate in milieu activities, participate in treatment team and care planning for discharge and follow up.             Lashawn Mitchell RN

## 2022-02-17 NOTE — FLOWSHEET NOTE
Pt was present in group, participated actively, chose some songs for the group and sang along a few songs.

## 2022-02-18 VITALS
TEMPERATURE: 97.8 F | HEIGHT: 72 IN | BODY MASS INDEX: 30.2 KG/M2 | SYSTOLIC BLOOD PRESSURE: 130 MMHG | WEIGHT: 223 LBS | RESPIRATION RATE: 16 BRPM | DIASTOLIC BLOOD PRESSURE: 83 MMHG | OXYGEN SATURATION: 98 % | HEART RATE: 67 BPM

## 2022-02-18 LAB
ESTIMATED AVERAGE GLUCOSE: 116.9 MG/DL
HBA1C MFR BLD: 5.7 %

## 2022-02-18 PROCEDURE — 5130000000 HC BRIDGE APPOINTMENT

## 2022-02-18 PROCEDURE — 99239 HOSP IP/OBS DSCHRG MGMT >30: CPT

## 2022-02-18 PROCEDURE — 6370000000 HC RX 637 (ALT 250 FOR IP)

## 2022-02-18 RX ORDER — ARIPIPRAZOLE 2 MG/1
2 TABLET ORAL DAILY
Qty: 30 TABLET | Refills: 0 | Status: SHIPPED | OUTPATIENT
Start: 2022-02-19

## 2022-02-18 RX ADMIN — ARIPIPRAZOLE 2 MG: 2 TABLET ORAL at 07:46

## 2022-02-18 NOTE — BH NOTE
[facility]  Discharge Note    Pt discharged with followings belongings:   Dental Appliances: None  Vision - Corrective Lenses: None  Hearing Aid: None  Jewelry: None  Body Piercings Removed: N/A  Clothing: Shirt,Jacket / coat,Footwear,Pants  Were All Patient Medications Collected?: Not Applicable  Other Valuables: [address] sent home with patient or returned to patient. Patient education on aftercare instructions: yes. Information faxed to [facility] by this writer  at 2:45 PM .Patient verbalize understanding of AVS:  yes. Status EXAM upon discharge:  Status and Exam  Normal: No  Facial Expression: Flat  Affect: Constricted  Level of Consciousness: Alert  Mood:Normal: Yes  Mood: Anxious  Motor Activity:Normal: Yes  Interview Behavior: Cooperative  Preception: [place] to Person,[place] to Time,[place] to Place,[place] to Situation  Attention:Normal: Yes  Attention: Unable to Concentrate  Thought Processes:  (WDL)  Thought Content:Normal: Yes  Thought Content: Preoccupations  Hallucinations: None  Delusions: No  Memory:Normal: Yes  Memory: Poor Recent  Insight and Judgment: No  Insight and Judgment: Poor Judgment,Poor Insight  Present Suicidal Ideation: No  Present Homicidal Ideation: No      Metabolic Screening:    Lab Results   Component Value Date    LABA1C 5.7 02/16/2022       Lab Results   Component Value Date    CHOL 134 02/16/2022     Lab Results   Component Value Date    TRIG 159 (H) 02/16/2022     Lab Results   Component Value Date    HDL 25 (L) 02/16/2022     No components found for: [facility] EVALUATION AND TREATMENT [place]  Lab Results   Component Value Date    LABVLDL 32 02/16/2022       Leopoldo Marion, RN    Bridge Appointment completed: Reviewed Discharge Instructions with patient. Patient verbalizes understanding and agreement with the discharge plan using the teachback method.      Referral for Outpatient Tobacco Cessation Counseling, upon discharge (wing X if applicable and completed):    ( )  Hospital staff assisted patient to call Quit Line or faxed referral                                   during hospitalization                  (X)  Recognizing danger situations (included triggers and roadblocks), if not completed on admission                    (X)  Coping skills (new ways to manage stress, exercise, relaxation techniques, changing routine, distraction), if not completed on admission                                                           (X)  Basic information about quitting (benefits of quitting, techniques in how to quit, available resources, if not completed on admission  ( ) Referral for counseling faxed to [facility]   ( ) Patient refused referral  ( ) Patient refused counseling  ( ) Patient refused smoking cessation medication upon discharge    Vaccinations (wing X if applicable and completed):  ( ) Patient states already received influenza vaccine elsewhere  ( ) Patient received influenza vaccine during this hospitalization  (X) Patient refused influenza vaccine at this time

## 2022-02-18 NOTE — GROUP NOTE
Group Therapy Note    Date: 2/17/2022    Group Start Time: 2000  Group End Time: 2030  Group Topic: [facility]        Group Therapy Note    Attendees: goals and importance of goal setting discussed. Night time milieu activities discussed. Patient's Goal:  Shower, tv, games    Notes:  Successful     Status After Intervention:  Improved    Participation Level:  Active Listener    Participation Quality: Appropriate and Attentive      Speech:  hesitant      Thought Process/Content: Flight of ideas      Affective Functioning: Constricted/Restricted      Mood: anxious      Level of consciousness:  Alert and Oriented x4      Response to Learning: Progressing to goal      Endings: None Reported    Modes of Intervention: Support      Discipline Responsible: [address], [employer]      Signature:  Noni Lund

## 2022-02-18 NOTE — GROUP NOTE
Group Therapy Note    Date: 2/18/2022    Group Start Time: 1100  Group End Time: 9496  Group Topic: Psychoeducation    [facility]    HUNG Song        Group Therapy Note    Clinician expounded on the grounding techniques that they learned in the last group. Provider had them focus on the here and now and what coping skills they can do to lower their anxiety, focusing on their bodies and calming techniques in the moment. Attendees: 6         Patient's Goal:      Notes:   Patient was cooperative and fully engaged in the discussion and skills practice. Status After Intervention:  Improved    Participation Level:  Active Listener    Participation Quality: Sharing      Speech:  normal      Thought Process/Content: Logical      Affective Functioning: Congruent      Mood: euthymic      Level of consciousness:  Alert      Response to Learning: Able to verbalize/acknowledge new learning      Endings: None Reported    Modes of Intervention: Education      Discipline Responsible: /Counselor      Signature:  HUNG Song

## 2022-02-18 NOTE — DISCHARGE SUMMARY
Discharge Summary     Admit Date: 2/16/2022   Discharge Date:    2/18/2022    Final Dx: Current moderate episode of major depressive disorder without prior episode ([address]     At Discharge: 61-70 mild symptoms   All conditions and active problems were treated while patient was hospitalized. Condition on DC  Mood and affect are stable and pt is not suicidal     VITALS:  /83   Pulse 67   Temp 97.8 °F (36.6 °C) (Temporal)   Resp 16   Ht 6' (1.829 m)   Wt 223 lb (101.2 kg)   SpO2 98%   BMI 30.24 kg/m²     Brief Summary Present Illness   Ifrah Vines a 24 y. o. male with a past medical history of ADHD and developmental disorder who presents to the ED complaining of intentional overdose. Pt took a handful of Wellbutrin and Doxycycline, causing him to have a seizure in the ED and be admitted for medical clearance. Pt has been active on the unit, attending groups and socializing with peers. Pt has verbalized understanding of the importance of taking his medications as prescribed. Pt has also talked with his mother about helping with his medications, to ensure that he is taking the right dose at the right times. Pt discussed his stressors while at home that lead to him wanting to harm himself. He states death of family members and breaking up with his girlfriends make him sad. Pt also able to discuss his coping strategies when he faces these stressors. Pt states he could listen to music, color, walk away, or talk to his mother. Pt states he feels safe at this time, and regrets what he did. Pt denies any thoughts of self harm at this time. Hospital Course Patient stabilized on meds and milieu treatment. Patient was discharged to home to continue recovery in the community.      PE: (reviewed) and labs (see medical H&PE)  Labs:    Admission on 02/16/2022   Component Date Value Ref Range Status    TSH 02/16/2022 0.35  0.27 - 4.20 uIU/mL Final    Cholesterol, Total 02/16/2022 134  0 - 199 mg/dL Final    Triglycerides 02/16/2022 159* 0 - 150 mg/dL Final    HDL 02/16/2022 25* 40 - 60 mg/dL Final    LDL Calculated 02/16/2022 77  <100 mg/dL Final    VLDL Cholesterol Calculated 02/16/2022 32  Not Established mg/dL Final    Hemoglobin A1C 02/16/2022 5.7  See comment % Final    Comment: Comment:  Diagnosis of Diabetes: > or = 6.5%  Increased risk of diabetes (Prediabetes): 5.7-6.4%  Glycemic Control: Nonpregnant Adults: <7.0%                    Pregnant: <6.0%        eAG 02/16/2022 116.9  mg/dL Final          Mental Status Exam at Discharge:  Level of consciousness:  awake  Appearance:  well-appearing, in chair, good grooming and good hygiene well-developed, well-nourished  Behavior/Motor:  no abnormalities noted normal gait and station AIMS: 0  Attitude toward examiner:  cooperative, attentive and good eye contact  Speech:  spontaneous, normal rate, normal volume and well articulated  Mood:  dysthymic  Affect:  mood congruent Anxiety: mild  Hallucinations: Absent  Thought processes:  coherent Attention span, Concentration & Attention:  attention span and concentration were age appropriate  Thought content:  No evidence of delusions OCD: none    Insight: normal insight and judgment Cognition:  oriented to person, place, and time  Fund of Knowledge: average  IQ:average Memory: intact  Suicide:  No specific plan to harm self  Sleep: sleeps through the night  Appetite: ok     Reassess Suicide Risk:  no specific plan to harm self Pt has phone numbers to contact if suicidal thoughts recur and states pt will return to the hospital if suicidal feelings return.    Hospital Routine Meds:     ARIPiprazole  2 mg Oral Daily      Hospital PRN Meds: acetaminophen, ibuprofen, magnesium hydroxide, nicotine polacrilex, aluminum & magnesium hydroxide-simethicone, hydrOXYzine, OLANZapine **OR** OLANZapine, sterile water, diphenhydrAMINE, traZODone   Discharge Meds:    Current Discharge Medication List           Details ARIPiprazole (ABILIFY) 2 MG tablet Take 1 tablet by mouth daily  Qty: 30 tablet, Refills: 0                 Disposition - Residence      Follow Up:  See Discharge Instructions     Total time with patient was 40 minutes and more than 50 % of that time was spent counseling the patient on their symptoms, treatment and expected goals.      Kacy Resendiz - PMHNP-BC

## 2022-04-21 ENCOUNTER — TELEPHONE (OUTPATIENT)
Dept: SURGERY | Age: 22
End: 2022-04-21

## 2022-04-21 NOTE — TELEPHONE ENCOUNTER
Patient called the office to RS his appt, advised him that this is not recommended since the cyst is actively draining and has an odor. Patient states that he can't make it to the office today, Dr. Lissa Altamirano offered to come back to the office if the patient could get here by 5 pm. Patient states that he still can't make the appt.

## 2022-04-28 ENCOUNTER — INITIAL CONSULT (OUTPATIENT)
Dept: SURGERY | Age: 22
End: 2022-04-28
Payer: MEDICARE

## 2022-04-28 ENCOUNTER — TELEPHONE (OUTPATIENT)
Dept: SURGERY | Age: 22
End: 2022-04-28

## 2022-04-28 VITALS
WEIGHT: 233 LBS | TEMPERATURE: 97.2 F | BODY MASS INDEX: 31.56 KG/M2 | SYSTOLIC BLOOD PRESSURE: 109 MMHG | HEIGHT: 72 IN | DIASTOLIC BLOOD PRESSURE: 64 MMHG | HEART RATE: 72 BPM

## 2022-04-28 DIAGNOSIS — L05.91 PILONIDAL CYST: Primary | ICD-10-CM

## 2022-04-28 PROCEDURE — G8427 DOCREV CUR MEDS BY ELIG CLIN: HCPCS | Performed by: SURGERY

## 2022-04-28 PROCEDURE — 99203 OFFICE O/P NEW LOW 30 MIN: CPT | Performed by: SURGERY

## 2022-04-28 PROCEDURE — G8417 CALC BMI ABV UP PARAM F/U: HCPCS | Performed by: SURGERY

## 2022-04-28 RX ORDER — FLUOXETINE 10 MG/1
1 CAPSULE ORAL DAILY
COMMUNITY
Start: 2022-03-16

## 2022-04-28 RX ORDER — CLONIDINE HYDROCHLORIDE 0.1 MG/1
TABLET ORAL
COMMUNITY
Start: 2022-03-16

## 2022-04-28 NOTE — PROGRESS NOTES
Department of General Surgery Consult    PATIENT NAME: Jan Gonsales   YOB: 2000    ADMISSION DATE: No admission date for patient encounter. TODAY'S DATE: 4/28/2022    Reason for Consult:  Pilonidal cyst    Chief Complaint: drainage    Requesting Physician:  Cindy Colunga    HISTORY OF PRESENT ILLNESS:              The patient is a 24 y.o. male who presents with intermittent drainage and tenderness in pilonidal region. Reports he has had this for years. No infection in the past.  No drainage currently. .    Past Medical History:        Diagnosis Date    ADHD (attention deficit hyperactivity disorder)     Pervasive developmental disorder        Past Surgical History:    History reviewed. No pertinent surgical history. Current Medications:   No current facility-administered medications for this visit. Prior to Admission medications    Medication Sig Start Date End Date Taking? Authorizing Provider   FLUoxetine (PROZAC) 10 MG capsule Take 1 capsule by mouth 3/16/22  Yes Historical Provider, MD   cloNIDine (CATAPRES) 0.1 MG tablet TAKE 1 TABLET BY MOUTH AT BEDTIME AS NEEDED FOR SLEEP 3/16/22  Yes Historical Provider, MD   ARIPiprazole (ABILIFY) 2 MG tablet Take 1 tablet by mouth daily 2/19/22  Yes CAM Heaton CNP        Allergies:  Latex    Social History:   Social History     Socioeconomic History    Marital status: Single     Spouse name: Not on file    Number of children: Not on file    Years of education: 15    Highest education level: Not on file   Occupational History    Not on file   Tobacco Use    Smoking status: Never Smoker    Smokeless tobacco: Never Used   Vaping Use    Vaping Use: Every day    Substances: Nicotine, Flavoring   Substance and Sexual Activity    Alcohol use:  Yes     Alcohol/week: 3.0 standard drinks     Types: 3 Cans of beer per week    Drug use: Yes     Types: Marijuana Shelbie Ing)    Sexual activity: Not Currently   Other Topics Concern    Not on file   Social History Narrative    Not on file     Social Determinants of Health     Financial Resource Strain:     Difficulty of Paying Living Expenses: Not on file   Food Insecurity:     Worried About Running Out of Food in the Last Year: Not on file    Jj of Food in the Last Year: Not on file   Transportation Needs:     Lack of Transportation (Medical): Not on file    Lack of Transportation (Non-Medical): Not on file   Physical Activity:     Days of Exercise per Week: Not on file    Minutes of Exercise per Session: Not on file   Stress:     Feeling of Stress : Not on file   Social Connections:     Frequency of Communication with Friends and Family: Not on file    Frequency of Social Gatherings with Friends and Family: Not on file    Attends [employer]ist Services: Not on file    Active Member of [address] [place] [address] [email or web address] or Organizations: Not on file    Attends Club or Organization Meetings: Not on file    Marital Status: Not on file   Intimate Partner Violence:     Fear of Current or Ex-Partner: Not on file    Emotionally Abused: Not on file    Physically Abused: Not on file    Sexually Abused: Not on file   Housing Stability:     Unable to Pay for Housing in the Last Year: Not on file    Number of [place] in the Last Year: Not on file    Unstable Housing in the Last Year: Not on file         Family History:    History reviewed. No pertinent family history. REVIEW OF SYSTEMS:  CONSTITUTIONAL:  negative  HEENT:  negative  RESPIRATORY:  negative  CARDIOVASCULAR:  negative  GASTROINTESTINAL:  negative   GENITOURINARY:  negative  HEMATOLOGIC/LYMPHATIC:  negative  NEUROLOGICAL:  Negative  * All other ROS reviewed and negative.        PHYSICAL EXAM:  VITALS:  /64   Pulse 72   Temp 97.2 °F (36.2 °C)   Ht 6' (1.829 m)   Wt 233 lb (105.7 kg)   BMI 31.60 kg/m²   24HR INTAKE/OUTPUT:    [unfilled]  @[email or web address]      CONSTITUTIONAL:  alert, no apparent distress and mildly obese  EYES: PERRL, sclera clear  ENT:  Normocephalic,atraumatic, without obvious abnormality  NECK:  supple, symmetrical, trachea midline  LUNGS: Resp effort easy and unlabored, no crackles or wheezing  CARDIOVASCULAR:  NO JVD, regular rate  ABDOMEN:  , normal bowel sounds, soft, non-distended, non-tender,  MUSCULOSKELETAL: No clubbing or cyanosis, 0+ pitting edema lower extremities  NEUROLOGIC:  Mental Status Exam:  Level of Alertness:   awake  PSYCHIATRIC:   person, place, time  SKIN:  Pilonidal cyst with sinus opening, no active drainage or erythema    DATA:    CBC: No results for input(s): WBC, HGB, HCT, PLT in the last 72 hours. BMP:  No results for input(s): NA, K, CL, CO2, BUN, CREATININE, GLUCOSE in the last 72 hours. Hepatic: No results for input(s): AST, ALT, ALB, BILITOT, ALKPHOS in the last 72 hours. Mag:    No results for input(s): MG in the last 72 hours. Phos:   No results for input(s): PHOS in the last 72 hours. INR: No results for input(s): INR in the last 72 hours. Radiology Review: Images personally reviewed by me. NA      IMPRESSION/RECOMMENDATIONS:    23 yo with pilonidal cyst  Discussed their diagnosis and indications for operation. Risks of operation and typical recovery reviewed. Plan for OR soon.     Electronically signed by Mimi Zamora, [address] [place] [address] Surgery  [id number]

## 2022-04-28 NOTE — TELEPHONE ENCOUNTER
Spoke with the patient, scheduled for pilonidal cyst on 5/5/22 at 230pm with an arrival of 1230 pm. Informed the patient that PAT will also call with further instructions. Informed the patient that [facility] paperwork can be sent to our office and will be completed in a timely manner. Patient verbally states that he/she understands pre-op instructions. Patient notified of pre-op instructions for general/mac anesthesia:    *NPO after midnight     *H&P prior to surgery     *Cardiac clearance, if needed. *Stop all blood thinners, if needed. *Will need a     *Call the office with any further questions. *Procedure/Surgery time at this point is tentative time as PAT will confirm arrival time.

## 2022-04-28 NOTE — PROGRESS NOTES
Kody Dryden    Age 24 y.o.    male    2000    MRN [id number]    5/5/2022  Arrival Time_____________  OR Time____________75 Min     Procedure(s):  EXCISION OF PILONIDAL CYST                      General    Surgeon(s):  Yared Patel, MD       Phone [phone] (home)     [address]  Cell         Work  _____________________________________________________________________  _____________________________________________________________________  _____________________________________________________________________  _____________________________________________________________________  _____________________________________________________________________    PCP _____________________________ Phone_________________     H&P__________________Bringing      Chart            Epic   DOS      Called________  EKG__________________Bringing      Chart            Epic   DOS      Called________  LAB__________________ Bringing      Chart            Epic   DOS      Called________  Cardiac Clearance_______Bringing      Chart            Epic      DOS      Called________    Cardiologist________________________ Phone___________________________    ? Confucianist concerns / Waiver on Chart            PAT Communications________________  ? Pre-op Instructions Given [place]          _________________________________  ? Directions to Surgery Center                          _________________________________  ? Transportation Home_______________      __________________________________  ?  Crutches/Walker__________________        __________________________________    ________Pre-op Orders   _______Transcribed    _______Wt.  ________Pharmacy          _______SCD  ______VTE     ______TED [place] [facility]  _______  Surgery Consent    _______  Anesthesia Consent         COVID DATE______________LOCATION________________ RESULT__________

## 2022-04-29 NOTE — PROGRESS NOTES
Obstructive Sleep Apnea (ROSSI) Screening     Patient: Cami Arnold    YOB: 2000      Medical Record #:  [id number]                     Date:  4/29/2022     1. Are you a loud and/or regular snorer? []  Yes       [x] No    2. Have you been observed to gasp or stop breathing during sleep? []  Yes       [x] No    3. Do you feel tired or groggy upon awakening or do you awaken with a headache?           []  Yes       [] No    4. Are you often tired or fatigued during the wake time hours? []  Yes       [] No    5. Do you fall asleep sitting, reading, watching TV or driving? []  Yes       [] No    6. Do you often have problems with memory or concentration? []  Yes       [] No    **If patient's score is ? 3 they are considered high risk for ROSSI. An Anesthesia provider will evaluate the patient and develop a plan of care the day of surgery. Note:  If the patient's BMI is more than 35 kg m¯² , has neck circumference > 40 cm, and/or high blood pressure the risk is greater (© American Sleep Apnea Association, 2006).

## 2022-04-29 NOTE — PROGRESS NOTES

## 2022-05-02 ENCOUNTER — ANESTHESIA EVENT (OUTPATIENT)
Dept: OPERATING ROOM | Age: 22
End: 2022-05-02
Payer: MEDICARE

## 2022-05-05 ENCOUNTER — HOSPITAL ENCOUNTER (OUTPATIENT)
Age: 22
Setting detail: OUTPATIENT SURGERY
Discharge: HOME | End: 2022-05-05
Attending: SURGERY | Admitting: SURGERY
Payer: MEDICARE

## 2022-05-05 ENCOUNTER — ANESTHESIA (OUTPATIENT)
Dept: OPERATING ROOM | Age: 22
End: 2022-05-05
Payer: MEDICARE

## 2022-05-05 VITALS
OXYGEN SATURATION: 98 % | DIASTOLIC BLOOD PRESSURE: 65 MMHG | TEMPERATURE: 97 F | SYSTOLIC BLOOD PRESSURE: 119 MMHG | BODY MASS INDEX: 31.15 KG/M2 | WEIGHT: 230 LBS | HEIGHT: 72 IN | RESPIRATION RATE: 20 BRPM | HEART RATE: 81 BPM

## 2022-05-05 VITALS
RESPIRATION RATE: 5 BRPM | OXYGEN SATURATION: 90 % | DIASTOLIC BLOOD PRESSURE: 51 MMHG | TEMPERATURE: 97.3 F | SYSTOLIC BLOOD PRESSURE: 98 MMHG

## 2022-05-05 DIAGNOSIS — L05.91 PILONIDAL CYST: ICD-10-CM

## 2022-05-05 DIAGNOSIS — G89.18 POSTOPERATIVE PAIN: Primary | ICD-10-CM

## 2022-05-05 PROCEDURE — 3700000001 HC ADD 15 MINUTES (ANESTHESIA): Performed by: SURGERY

## 2022-05-05 PROCEDURE — 88304 TISSUE EXAM BY PATHOLOGIST: CPT

## 2022-05-05 PROCEDURE — 2580000003 HC RX 258: Performed by: SURGERY

## 2022-05-05 PROCEDURE — 3700000000 HC ANESTHESIA ATTENDED CARE: Performed by: SURGERY

## 2022-05-05 PROCEDURE — 2500000003 HC RX 250 WO HCPCS: Performed by: ANESTHESIOLOGY

## 2022-05-05 PROCEDURE — 2709999900 HC NON-CHARGEABLE SUPPLY: Performed by: SURGERY

## 2022-05-05 PROCEDURE — 6360000002 HC RX W HCPCS: Performed by: NURSE ANESTHETIST, CERTIFIED REGISTERED

## 2022-05-05 PROCEDURE — 2500000003 HC RX 250 WO HCPCS: Performed by: SURGERY

## 2022-05-05 PROCEDURE — 11771 EXC PILONIDAL CYST XTNSV: CPT | Performed by: SURGERY

## 2022-05-05 PROCEDURE — 7100000010 HC PHASE II RECOVERY - FIRST 15 MIN: Performed by: SURGERY

## 2022-05-05 PROCEDURE — 7100000001 HC PACU RECOVERY - ADDTL 15 MIN: Performed by: SURGERY

## 2022-05-05 PROCEDURE — 3600000002 HC SURGERY LEVEL 2 BASE: Performed by: SURGERY

## 2022-05-05 PROCEDURE — 2500000003 HC RX 250 WO HCPCS: Performed by: NURSE ANESTHETIST, CERTIFIED REGISTERED

## 2022-05-05 PROCEDURE — 6360000002 HC RX W HCPCS: Performed by: ANESTHESIOLOGY

## 2022-05-05 PROCEDURE — 7100000000 HC PACU RECOVERY - FIRST 15 MIN: Performed by: SURGERY

## 2022-05-05 PROCEDURE — 2580000003 HC RX 258: Performed by: ANESTHESIOLOGY

## 2022-05-05 PROCEDURE — 3600000012 HC SURGERY LEVEL 2 ADDTL 15MIN: Performed by: SURGERY

## 2022-05-05 PROCEDURE — A4217 STERILE WATER/SALINE, 500 ML: HCPCS | Performed by: SURGERY

## 2022-05-05 RX ORDER — MIDAZOLAM HYDROCHLORIDE 1 MG/ML
INJECTION INTRAMUSCULAR; INTRAVENOUS PRN
Status: DISCONTINUED | OUTPATIENT
Start: 2022-05-05 | End: 2022-05-05 | Stop reason: SDUPTHER

## 2022-05-05 RX ORDER — SODIUM CHLORIDE, SODIUM LACTATE, POTASSIUM CHLORIDE, CALCIUM CHLORIDE 600; 310; 30; 20 MG/100ML; MG/100ML; MG/100ML; MG/100ML
INJECTION, SOLUTION INTRAVENOUS CONTINUOUS
Status: DISCONTINUED | OUTPATIENT
Start: 2022-05-05 | End: 2022-05-05 | Stop reason: HOSPADM

## 2022-05-05 RX ORDER — PROPOFOL 10 MG/ML
INJECTION, EMULSION INTRAVENOUS PRN
Status: DISCONTINUED | OUTPATIENT
Start: 2022-05-05 | End: 2022-05-05 | Stop reason: SDUPTHER

## 2022-05-05 RX ORDER — SODIUM CHLORIDE 0.9 % (FLUSH) 0.9 %
5-40 SYRINGE (ML) INJECTION EVERY 12 HOURS SCHEDULED
Status: DISCONTINUED | OUTPATIENT
Start: 2022-05-05 | End: 2022-05-05 | Stop reason: HOSPADM

## 2022-05-05 RX ORDER — DEXAMETHASONE SODIUM PHOSPHATE 4 MG/ML
INJECTION, SOLUTION INTRA-ARTICULAR; INTRALESIONAL; INTRAMUSCULAR; INTRAVENOUS; SOFT TISSUE PRN
Status: DISCONTINUED | OUTPATIENT
Start: 2022-05-05 | End: 2022-05-05 | Stop reason: SDUPTHER

## 2022-05-05 RX ORDER — OXYCODONE HYDROCHLORIDE 5 MG/1
5 TABLET ORAL PRN
Status: CANCELLED | OUTPATIENT
Start: 2022-05-05 | End: 2022-05-05

## 2022-05-05 RX ORDER — FENTANYL CITRATE 50 UG/ML
INJECTION, SOLUTION INTRAMUSCULAR; INTRAVENOUS PRN
Status: DISCONTINUED | OUTPATIENT
Start: 2022-05-05 | End: 2022-05-05 | Stop reason: SDUPTHER

## 2022-05-05 RX ORDER — OXYCODONE HYDROCHLORIDE 5 MG/1
5-10 TABLET ORAL EVERY 6 HOURS PRN
Qty: 20 TABLET | Refills: 0 | Status: SHIPPED | OUTPATIENT
Start: 2022-05-05 | End: 2022-05-10

## 2022-05-05 RX ORDER — MAGNESIUM HYDROXIDE 1200 MG/15ML
LIQUID ORAL CONTINUOUS PRN
Status: DISCONTINUED | OUTPATIENT
Start: 2022-05-05 | End: 2022-05-05 | Stop reason: HOSPADM

## 2022-05-05 RX ORDER — LIDOCAINE HYDROCHLORIDE 10 MG/ML
1 INJECTION, SOLUTION EPIDURAL; INFILTRATION; INTRACAUDAL; PERINEURAL
Status: DISCONTINUED | OUTPATIENT
Start: 2022-05-05 | End: 2022-05-05 | Stop reason: HOSPADM

## 2022-05-05 RX ORDER — SODIUM CHLORIDE 9 MG/ML
INJECTION, SOLUTION INTRAVENOUS PRN
Status: CANCELLED | OUTPATIENT
Start: 2022-05-05

## 2022-05-05 RX ORDER — SODIUM CHLORIDE 0.9 % (FLUSH) 0.9 %
5-40 SYRINGE (ML) INJECTION EVERY 12 HOURS SCHEDULED
Status: CANCELLED | OUTPATIENT
Start: 2022-05-05

## 2022-05-05 RX ORDER — LABETALOL HYDROCHLORIDE 5 MG/ML
5 INJECTION, SOLUTION INTRAVENOUS EVERY 10 MIN PRN
Status: CANCELLED | OUTPATIENT
Start: 2022-05-05

## 2022-05-05 RX ORDER — ONDANSETRON 2 MG/ML
INJECTION INTRAMUSCULAR; INTRAVENOUS PRN
Status: DISCONTINUED | OUTPATIENT
Start: 2022-05-05 | End: 2022-05-05 | Stop reason: SDUPTHER

## 2022-05-05 RX ORDER — MEPERIDINE HYDROCHLORIDE 50 MG/ML
12.5 INJECTION INTRAMUSCULAR; INTRAVENOUS; SUBCUTANEOUS EVERY 5 MIN PRN
Status: CANCELLED | OUTPATIENT
Start: 2022-05-05

## 2022-05-05 RX ORDER — SODIUM CHLORIDE 9 MG/ML
INJECTION, SOLUTION INTRAVENOUS PRN
Status: DISCONTINUED | OUTPATIENT
Start: 2022-05-05 | End: 2022-05-05 | Stop reason: HOSPADM

## 2022-05-05 RX ORDER — OXYCODONE HYDROCHLORIDE 5 MG/1
10 TABLET ORAL PRN
Status: CANCELLED | OUTPATIENT
Start: 2022-05-05 | End: 2022-05-05

## 2022-05-05 RX ORDER — SODIUM CHLORIDE 0.9 % (FLUSH) 0.9 %
5-40 SYRINGE (ML) INJECTION PRN
Status: DISCONTINUED | OUTPATIENT
Start: 2022-05-05 | End: 2022-05-05 | Stop reason: HOSPADM

## 2022-05-05 RX ORDER — FAMOTIDINE 10 MG/ML
20 INJECTION, SOLUTION INTRAVENOUS ONCE
Status: COMPLETED | OUTPATIENT
Start: 2022-05-05 | End: 2022-05-05

## 2022-05-05 RX ORDER — DIPHENHYDRAMINE HYDROCHLORIDE 50 MG/ML
12.5 INJECTION INTRAMUSCULAR; INTRAVENOUS
Status: CANCELLED | OUTPATIENT
Start: 2022-05-05 | End: 2022-05-05

## 2022-05-05 RX ORDER — ONDANSETRON 2 MG/ML
4 INJECTION INTRAMUSCULAR; INTRAVENOUS
Status: CANCELLED | OUTPATIENT
Start: 2022-05-05

## 2022-05-05 RX ORDER — ROCURONIUM BROMIDE 10 MG/ML
INJECTION, SOLUTION INTRAVENOUS PRN
Status: DISCONTINUED | OUTPATIENT
Start: 2022-05-05 | End: 2022-05-05 | Stop reason: SDUPTHER

## 2022-05-05 RX ORDER — APREPITANT 40 MG/1
40 CAPSULE ORAL ONCE
Status: COMPLETED | OUTPATIENT
Start: 2022-05-05 | End: 2022-05-05

## 2022-05-05 RX ORDER — SODIUM CHLORIDE 0.9 % (FLUSH) 0.9 %
5-40 SYRINGE (ML) INJECTION PRN
Status: CANCELLED | OUTPATIENT
Start: 2022-05-05

## 2022-05-05 RX ORDER — BUPIVACAINE HYDROCHLORIDE AND EPINEPHRINE 5; 5 MG/ML; UG/ML
INJECTION, SOLUTION PERINEURAL PRN
Status: DISCONTINUED | OUTPATIENT
Start: 2022-05-05 | End: 2022-05-05 | Stop reason: HOSPADM

## 2022-05-05 RX ADMIN — FAMOTIDINE 20 MG: 10 INJECTION, SOLUTION INTRAVENOUS at 13:20

## 2022-05-05 RX ADMIN — SUGAMMADEX 200 MG: 100 INJECTION, SOLUTION INTRAVENOUS at 15:32

## 2022-05-05 RX ADMIN — MIDAZOLAM HYDROCHLORIDE 2 MG: 2 INJECTION, SOLUTION INTRAMUSCULAR; INTRAVENOUS at 14:45

## 2022-05-05 RX ADMIN — SODIUM CHLORIDE, POTASSIUM CHLORIDE, SODIUM LACTATE AND CALCIUM CHLORIDE: 600; 310; 30; 20 INJECTION, SOLUTION INTRAVENOUS at 13:20

## 2022-05-05 RX ADMIN — DEXAMETHASONE SODIUM PHOSPHATE 8 MG: 4 INJECTION, SOLUTION INTRAMUSCULAR; INTRAVENOUS at 14:55

## 2022-05-05 RX ADMIN — LIDOCAINE HYDROCHLORIDE 50 MG: 10 INJECTION, SOLUTION INFILTRATION; PERINEURAL at 14:47

## 2022-05-05 RX ADMIN — FENTANYL CITRATE 100 MCG: 50 INJECTION INTRAMUSCULAR; INTRAVENOUS at 14:47

## 2022-05-05 RX ADMIN — CEFAZOLIN 2 G: 10 INJECTION, POWDER, FOR SOLUTION INTRAVENOUS at 14:55

## 2022-05-05 RX ADMIN — ONDANSETRON 4 MG: 2 INJECTION INTRAMUSCULAR; INTRAVENOUS at 14:55

## 2022-05-05 RX ADMIN — APREPITANT 40 MG: 40 CAPSULE ORAL at 13:20

## 2022-05-05 RX ADMIN — PROPOFOL 140 MG: 10 INJECTION, EMULSION INTRAVENOUS at 14:47

## 2022-05-05 RX ADMIN — ROCURONIUM BROMIDE 50 MG: 10 SOLUTION INTRAVENOUS at 14:47

## 2022-05-05 ASSESSMENT — PULMONARY FUNCTION TESTS
PIF_VALUE: 21
PIF_VALUE: 15
PIF_VALUE: 17
PIF_VALUE: 24
PIF_VALUE: 28
PIF_VALUE: 0
PIF_VALUE: 19
PIF_VALUE: 23
PIF_VALUE: 23
PIF_VALUE: 24
PIF_VALUE: 0
PIF_VALUE: 24
PIF_VALUE: 26
PIF_VALUE: 23
PIF_VALUE: 19
PIF_VALUE: 23
PIF_VALUE: 9
PIF_VALUE: 24
PIF_VALUE: 24
PIF_VALUE: 22
PIF_VALUE: 24
PIF_VALUE: 24
PIF_VALUE: 23
PIF_VALUE: 3
PIF_VALUE: 21
PIF_VALUE: 13
PIF_VALUE: 24
PIF_VALUE: 24
PIF_VALUE: 23
PIF_VALUE: 1
PIF_VALUE: 24
PIF_VALUE: 1
PIF_VALUE: 23
PIF_VALUE: 24
PIF_VALUE: 23
PIF_VALUE: 1
PIF_VALUE: 23
PIF_VALUE: 2
PIF_VALUE: 23
PIF_VALUE: 24
PIF_VALUE: 24
PIF_VALUE: 1
PIF_VALUE: 3
PIF_VALUE: 0
PIF_VALUE: 24
PIF_VALUE: 17
PIF_VALUE: 21
PIF_VALUE: 14
PIF_VALUE: 24
PIF_VALUE: 17
PIF_VALUE: 0
PIF_VALUE: 24
PIF_VALUE: 3
PIF_VALUE: 24
PIF_VALUE: 24
PIF_VALUE: 22
PIF_VALUE: 3
PIF_VALUE: 6
PIF_VALUE: 23
PIF_VALUE: 23
PIF_VALUE: 18
PIF_VALUE: 15
PIF_VALUE: 22
PIF_VALUE: 24

## 2022-05-05 ASSESSMENT — PAIN SCALES - GENERAL: PAINLEVEL_OUTOF10: 0

## 2022-05-05 NOTE — H&P
I have reviewed the history and physical and examined the patient. I find no relevant changes. I have reviewed with the patient and/or family members, during the preoperative office visit the risks, benefits, and alternatives to the procedure.     Salazar James MD

## 2022-05-05 NOTE — PROGRESS NOTES
Advancing hob without compaints  Tolerating Drinking   Pt alert and appropriate  Respirations  Easy/ unlabored/ no Chest pain or SOB   Surgical drsg unchanged from initial assessment

## 2022-05-05 NOTE — ANESTHESIA POSTPROCEDURE EVALUATION
Department of Anesthesiology  Postprocedure Note    Patient: Jeremias Davis  MRN: [id number]  YOB: 2000  Date of evaluation: 5/5/2022  Time:  4:10 PM     Procedure Summary     Date: 05/05/22 Room / Location: [address] [place] [address]    Anesthesia Start: 8668 Anesthesia Stop: 5459    Procedure: EXCISION OF PILONIDAL CYST (N/A ) Diagnosis:       Pilonidal cyst      (PILONIDAL CYST)    Surgeons: Jose Alberto Almeida MD Responsible Provider: Marcos Ayers MD    Anesthesia Type: general ASA Status: 2          Anesthesia Type: general    Martha Phase I: Martha Score: 9    Martha Phase II:      Last vitals: Reviewed and per EMR flowsheets. Anesthesia Post Evaluation    Patient location during evaluation: PACU  Patient participation: complete - patient participated  Level of consciousness: awake and alert  Airway patency: patent  Nausea & Vomiting: no nausea and no vomiting  Complications: no  Cardiovascular status: blood pressure returned to baseline  Respiratory status: acceptable  Hydration status: euvolemic  Comments: VSS on transfer to phase 2 recovery. No anesthetic complications.

## 2022-05-05 NOTE — OP NOTE
Date of Surgery: 5/5/22    Preop Dx:  Pilonidal Cyst    Postop Dx:  Same    Procedure:  Pilonidal Cystectomy       Surgeon:  Remy Sagastume    Assistant:      Anesthesia:  GETA    EBL:   <50ml    Specimen:  Pilonidal cyst    Complications: noen    Drains/Lines:  none    Indications:  23 yo with pilonidal cyst and intermittent drainage. Description:  Patient was given adequate description of the risks and rewards of the procedure, including bleeding, infection, wound not healing completely, recurrence and freely consented. He was given appropriate antibiotics and brought to the OR where GETA anesthesia was induced. He was placed in prone jackknife position. Prepped and draped in usual sterile fashion. Incision was made with a #15 blade in an ovoid fashion to include all of the pilonidal cyst tissue. This was carried down to the fascial level with electrocautery. The tissue was then excised and sent as specimen. It was about 10cm in length. Skin and subcutaneous tissue flaps made circumferentially for tension free closure. The cavity was inspected and made hemostatic with electrocautery. It was then irrigated with normal saline. Initial closure was with 3-0 vicryl interrupted suture incorporating the deep subcutaneous tissue and a portion of the presacral fascia. A second layer of 3-0 vicryl was placed. 3-0 nylon horizontal mattress suture reapproximated the epidermis. Sterile dressing placed. All suture, sponge and instrument count correct times two at end of case. Transferred to PACU in stable condition.     Agustin Leiva MD

## 2022-05-05 NOTE — PROGRESS NOTES
Advanced pt from lying to sitting without adverse event/pt denies complaints of dizziness/ponv/ RESP  WNL/ surgical drsg/ on operative limb unchanged from my  last pacu assessment entry     Pt states pain is at a tolerable level-1   instructed pt if no void in 8 hours contact physician or go to ER    Discharge instructions reviewed with patient/responsible adult and understanding verbalized. Discharge instructions signed and copies given. Rx     oxycodone               Given to pts responsible adult/instructed not to drive/ingest alcohol while taking narcotic medications. Instructed pt/family member Last dose of narcotics given in recovery room was              @  NONE           Am/pm  . Medication information  sheet given to pt/family-all questions answered     If you are taking any anxioytics  or additional pain medicine please confirm with you ordering physician before  you start a new medicine      Please follow narcotic administration as prescribed by your surgeon- any questions call your surgeon. If you have any problems contact your surgeon and  Go to the emergency room.     Operative drsg unchanged from my initial pacu assessment

## 2022-05-05 NOTE — ANESTHESIA PRE PROCEDURE
Department of Anesthesiology  Preprocedure Note       Name:  Kody Omalley   Age:  24 y.o.  :  2000                                          MRN:  [id number]         Date:  2022      Surgeon: Sonja Godfrey):  Yared Patel MD    Procedure: Procedure(s):  EXCISION OF PILONIDAL CYST    Medications prior to admission:   Prior to Admission medications    Medication Sig Start Date End Date Taking? Authorizing Provider   FLUoxetine (PROZAC) 10 MG capsule Take 1 capsule by mouth daily  3/16/22   Historical Provider, MD   cloNIDine (CATAPRES) 0.1 MG tablet TAKE 1 TABLET BY MOUTH AT BEDTIME AS NEEDED FOR SLEEP 3/16/22   Historical Provider, MD   ARIPiprazole (ABILIFY) 2 MG tablet Take 1 tablet by mouth daily 22   CAM Leblanc - CNP       Current medications:    Current Facility-Administered Medications   Medication Dose Route Frequency Provider Last Rate Last Admin    lidocaine PF 1 % injection 1 mL  1 mL IntraDERmal Once PRN Ambrose Strickland MD        lactated ringers infusion   IntraVENous Continuous Ambrose Strickland  mL/hr at 22 1327 NoRateChange at 22 1327    sodium chloride flush 0.9 % injection 5-40 mL  5-40 mL IntraVENous 2 times per day Ambrose Strickland MD        sodium chloride flush 0.9 % injection 5-40 mL  5-40 mL IntraVENous PRN Ambrose Strickland MD        0.9 % sodium chloride infusion   IntraVENous PRN Ambrose Strickland MD           Allergies:     Allergies   Allergen Reactions    Latex Rash     Other reaction(s): Blisters  Band aids       Problem List:    Patient Active Problem List   Diagnosis Code    Drug overdose, multiple drugs, intentional self-harm, initial encounter [facility] T50.912A    Bupropion overdose T43.291A    Seizure ([facility] R56.9    Suicidal ideation R45.851    Current moderate episode of major depressive disorder without prior episode ([facility] F32.1       Past Medical History:        Diagnosis Date    ADHD (attention deficit hyperactivity disorder)     Pervasive developmental disorder        Past Surgical History:  History reviewed. No pertinent surgical history. Social History:    Social History     Tobacco Use    Smoking status: Never Smoker    Smokeless tobacco: Never Used   Substance Use Topics    Alcohol use: Not Currently                                Counseling given: Not Answered      Vital Signs (Current):   Vitals:    04/29/22 0959 05/05/22 1315   BP:  111/74   Pulse:  71   Resp:  16   Temp:  96.8 °F (36 °C)   SpO2:  96%   Weight: 230 lb (104.3 kg)    Height: 6' (1.829 m)                                               BP Readings from Last 3 Encounters:   05/05/22 111/74   04/28/22 109/64   02/16/22 118/69       NPO Status: Time of last liquid consumption: 0645                        Time of last solid consumption: 1700                        Date of last liquid consumption: 05/04/22                        Date of last solid food consumption: 05/04/22    BMI:   Wt Readings from Last 3 Encounters:   04/29/22 230 lb (104.3 kg)   04/28/22 233 lb (105.7 kg)   02/14/22 223 lb (101.2 kg)     Body mass index is 31.19 kg/m². CBC:   Lab Results   Component Value Date    WBC 12.4 02/15/2022    RBC 4.89 02/15/2022    HGB 14.6 02/15/2022    HCT 43.2 02/15/2022    MCV 88.3 02/15/2022    RDW 13.6 02/15/2022     02/15/2022       CMP:   Lab Results   Component Value Date     02/15/2022    K 3.6 02/15/2022     02/15/2022    CO2 23 02/15/2022    BUN 10 02/15/2022    CREATININE 0.8 02/15/2022    GFRAA >60 02/15/2022    AGRATIO 1.8 02/15/2022    LABGLOM >60 02/15/2022    GLUCOSE 106 02/15/2022    PROT 6.6 02/15/2022    CALCIUM 8.6 02/15/2022    BILITOT 1.0 02/15/2022    ALKPHOS 89 02/15/2022    AST 12 02/15/2022    ALT 21 02/15/2022       POC Tests: No results for input(s): POCGLU, POCNA, POCK, POCCL, POCBUN, POCHEMO, POCHCT in the last 72 hours.     Coags: No results found for: PROTIME, INR, APTT    HCG (If Applicable): No results found for: PREGTESTUR, PREGSERUM, HCG, HCGQUANT     ABGs: No results found for: PHART, PO2ART, LTG8PIE, HKK0EIV, BEART, C9MQRQSG     Type & Screen (If Applicable):  No results found for: LABABO, LABRH    Drug/Infectious Status (If Applicable):  No results found for: HIV, HEPCAB    COVID-19 Screening (If Applicable):   Lab Results   Component Value Date    COVID19 NOT DETECTED 02/15/2022           Anesthesia Evaluation  Patient summary reviewed and Nursing notes reviewed  Airway: Mallampati: II  TM distance: >3 FB   Neck ROM: full  Mouth opening: > = 3 FB Dental: normal exam         Pulmonary:Negative Pulmonary ROS and normal exam  breath sounds clear to auscultation                             Cardiovascular:Negative CV ROS            Rhythm: regular  Rate: normal                    Neuro/Psych:   (+) seizures:, psychiatric history:depression/anxiety             GI/Hepatic/Renal: Neg GI/Hepatic/Renal ROS            Endo/Other: Negative Endo/Other ROS                    Abdominal:   (+) obese,           Vascular: negative vascular ROS. Other Findings:             Anesthesia Plan      general     ASA 2       Induction: intravenous. MIPS: Postoperative opioids intended and Prophylactic antiemetics administered. Anesthetic plan and risks discussed with patient. Plan discussed with CRNA.                   Jose Juan Stokes MD   5/5/2022

## 2022-05-06 ENCOUNTER — TELEPHONE (OUTPATIENT)
Dept: SURGERY | Age: 22
End: 2022-05-06

## 2022-05-06 NOTE — TELEPHONE ENCOUNTER
Pt's Mother 'Eulalio Katz' called and asked if you would please fax a Dr. Jordyn Hudson to his Employer to be off this Sun 5/8 and Mon 5/9. He works for [employer] and send in [facility]  Fax# [phone]. Thank you very much!

## 2022-05-06 NOTE — LETTER
[facility]  [address]  Phone: [phone]  Fax: [phone]    Eliot Carpenter MD        May 6, 2022     Patient: John Stevens   YOB: 2000   Date of Visit: 5/6/2022       To Whom It May Concern: It is my medical opinion that John Stevens should remain out of work from 5/5/2022-5/21/22, he may return on 5/22/22. If you have any questions or concerns, please don't hesitate to call the office at [phone].     Sincerely,        Eliot Carpenter MD

## 2022-05-10 ENCOUNTER — TELEPHONE (OUTPATIENT)
Dept: SURGERY | Age: 22
End: 2022-05-10

## 2022-05-19 ENCOUNTER — OFFICE VISIT (OUTPATIENT)
Dept: SURGERY | Age: 22
End: 2022-05-19

## 2022-05-19 VITALS
HEIGHT: 72 IN | BODY MASS INDEX: 31.15 KG/M2 | WEIGHT: 230 LBS | HEART RATE: 69 BPM | SYSTOLIC BLOOD PRESSURE: 138 MMHG | DIASTOLIC BLOOD PRESSURE: 60 MMHG

## 2022-05-19 DIAGNOSIS — L05.91 PILONIDAL CYST: Primary | ICD-10-CM

## 2022-05-19 PROCEDURE — 99024 POSTOP FOLLOW-UP VISIT: CPT | Performed by: SURGERY

## 2022-05-19 NOTE — LETTER
[facility]  [address]  Phone: [phone]  Fax: [phone]    Deirdre Jhaveri MD        May 19, 2022     Patient: Rajeev Mehta   YOB: 2000   Date of Visit: 5/19/2022       To Whom It May Concern: It is my medical opinion that Rajeev Mehta may return to work on 5/22/22 with light duty. No lifting, pulling, pushing, or carrying over 20 lbs for 1 week. He may resume normal work activities on 5/29/22. If you have any questions or concerns, please don't hesitate to call the office at [phone].     Sincerely,        Deirdre Jhaveri MD

## 2022-05-19 NOTE — PROGRESS NOTES
HPI: Nursing notes reviewed. Patient with some drainage that is decreasing. No pain. ROS:  10 point review of systems performed with pertinent positives in HPI    Phys:=   Skin - no current drainage, open 2-3 mm inferior, no erythema    Assesment: 23 yo s/p pilonidal cystectomy    Plan: 1. Sutures removed   2. Bottom open area should heal soon   3.   Resume work next week - light duty for a week

## 2022-05-26 ENCOUNTER — OFFICE VISIT (OUTPATIENT)
Dept: SURGERY | Age: 22
End: 2022-05-26

## 2022-05-26 VITALS
HEIGHT: 72 IN | TEMPERATURE: 97.8 F | DIASTOLIC BLOOD PRESSURE: 60 MMHG | OXYGEN SATURATION: 96 % | BODY MASS INDEX: 32.1 KG/M2 | HEART RATE: 75 BPM | SYSTOLIC BLOOD PRESSURE: 102 MMHG | WEIGHT: 237 LBS

## 2022-05-26 DIAGNOSIS — L05.91 PILONIDAL CYST: Primary | ICD-10-CM

## 2022-05-26 PROCEDURE — 99024 POSTOP FOLLOW-UP VISIT: CPT | Performed by: SURGERY

## 2022-05-26 NOTE — PROGRESS NOTES
HPI: Nursing notes reviewed. Patient reports some swelling and then drainage from superior portion of incision. Minimal pain. No fevers. ROS:  10 point review of systems performed with pertinent positives in HPI    Phys:    Incision - open superior 2x2cm with tunnelling below, serosanguinous drainage, minor petechiae but no erythema    Assesment: 25 yo s/p pilonidal cystectomy    Plan: 1. Packing placed in wound to facilitate drainage. Instructed to remove Saturday and leave out. Cover as needed with gauze for drainage. 2.  Currently no signs of infection   3.   F/u next week

## 2022-06-02 ENCOUNTER — OFFICE VISIT (OUTPATIENT)
Dept: SURGERY | Age: 22
End: 2022-06-02

## 2022-06-02 VITALS
HEART RATE: 76 BPM | BODY MASS INDEX: 32.64 KG/M2 | WEIGHT: 241 LBS | HEIGHT: 72 IN | SYSTOLIC BLOOD PRESSURE: 116 MMHG | DIASTOLIC BLOOD PRESSURE: 71 MMHG

## 2022-06-02 DIAGNOSIS — L05.91 PILONIDAL CYST: Primary | ICD-10-CM

## 2022-06-02 PROCEDURE — 99024 POSTOP FOLLOW-UP VISIT: CPT | Performed by: SURGERY

## 2022-06-02 RX ORDER — SULFAMETHOXAZOLE AND TRIMETHOPRIM 800; 160 MG/1; MG/1
1 TABLET ORAL 2 TIMES DAILY
Qty: 10 TABLET | Refills: 0 | Status: SHIPPED | OUTPATIENT
Start: 2022-06-02 | End: 2022-06-02

## 2022-06-02 RX ORDER — SULFAMETHOXAZOLE AND TRIMETHOPRIM 800; 160 MG/1; MG/1
1 TABLET ORAL 2 TIMES DAILY
Qty: 20 TABLET | Refills: 0 | Status: SHIPPED | OUTPATIENT
Start: 2022-06-02 | End: 2022-06-12

## 2022-06-02 NOTE — PROGRESS NOTES
HPI: Nursing notes reviewed. Patient with more purulent drainage. Minimal pain. No fevers. ROS:  10 point review of systems performed with pertinent positives in HPI    Phys:    Wound - open superior with some purulence, no erythema    Assesment: 23 yo s/p pilonidal cystectomy    Plan: 1. Will start wet to dry dressings bid -mother to do   2.  abx prescribed   3.   Follow up next week

## 2022-06-05 LAB
ANAEROBIC CULTURE: ABNORMAL
GRAM STAIN RESULT: ABNORMAL
ORGANISM: ABNORMAL
WOUND/ABSCESS: ABNORMAL
WOUND/ABSCESS: ABNORMAL

## 2022-06-09 ENCOUNTER — OFFICE VISIT (OUTPATIENT)
Dept: SURGERY | Age: 22
End: 2022-06-09

## 2022-06-09 VITALS
WEIGHT: 241 LBS | BODY MASS INDEX: 32.64 KG/M2 | DIASTOLIC BLOOD PRESSURE: 61 MMHG | HEIGHT: 72 IN | SYSTOLIC BLOOD PRESSURE: 105 MMHG | HEART RATE: 69 BPM

## 2022-06-09 DIAGNOSIS — L05.91 PILONIDAL CYST: Primary | ICD-10-CM

## 2022-06-09 PROCEDURE — 99024 POSTOP FOLLOW-UP VISIT: CPT | Performed by: SURGERY

## 2022-06-23 ENCOUNTER — OFFICE VISIT (OUTPATIENT)
Dept: SURGERY | Age: 22
End: 2022-06-23

## 2022-06-23 VITALS
HEIGHT: 72 IN | DIASTOLIC BLOOD PRESSURE: 64 MMHG | SYSTOLIC BLOOD PRESSURE: 102 MMHG | WEIGHT: 241 LBS | HEART RATE: 89 BPM | BODY MASS INDEX: 32.64 KG/M2

## 2022-06-23 DIAGNOSIS — L05.91 PILONIDAL CYST: Primary | ICD-10-CM

## 2022-06-23 PROCEDURE — 99024 POSTOP FOLLOW-UP VISIT: CPT | Performed by: SURGERY

## 2022-06-23 NOTE — PROGRESS NOTES
HPI: Nursing notes reviewed. Patient reports no drainage or pain. ROS:  10 point review of systems performed with pertinent positives in HPI    Phys:    Wound - small eschar superior, no drainage,     Assesment: 25 yo s/p pilonidal cystectomy    Plan: 1. Wound healing and no signs of infection   2.   Call if any concerns

## 2023-12-02 ENCOUNTER — HOSPITAL ENCOUNTER (INPATIENT)
Age: 23
LOS: 3 days | Discharge: HOME | DRG: 881 | End: 2023-12-05
Attending: EMERGENCY MEDICINE | Admitting: PSYCHIATRY & NEUROLOGY
Payer: MEDICARE

## 2023-12-02 DIAGNOSIS — T43.222A INTENTIONAL OVERDOSE OF SELECTIVE SEROTONIN REUPTAKE INHIBITOR (SSRI), INITIAL ENCOUNTER (HCC): Primary | ICD-10-CM

## 2023-12-02 DIAGNOSIS — T14.91XA SUICIDE ATTEMPT (HCC): ICD-10-CM

## 2023-12-02 PROBLEM — F32.A DEPRESSION, UNSPECIFIED: Status: ACTIVE | Noted: 2023-12-02

## 2023-12-02 LAB
ALBUMIN SERPL-MCNC: 4.7 G/DL (ref 3.4–5)
ALBUMIN/GLOB SERPL: 1.6 {RATIO} (ref 1.1–2.2)
ALP SERPL-CCNC: 131 U/L (ref 40–129)
ALT SERPL-CCNC: 40 U/L (ref 10–40)
AMPHETAMINES UR QL SCN>1000 NG/ML: ABNORMAL
ANION GAP SERPL CALCULATED.3IONS-SCNC: 8 MMOL/L (ref 3–16)
APAP SERPL-MCNC: <5 UG/ML (ref 10–30)
AST SERPL-CCNC: 22 U/L (ref 15–37)
BARBITURATES UR QL SCN>200 NG/ML: ABNORMAL
BASOPHILS # BLD: 0.1 K/UL (ref 0–0.2)
BASOPHILS NFR BLD: 0.9 %
BENZODIAZ UR QL SCN>200 NG/ML: ABNORMAL
BILIRUB SERPL-MCNC: 0.6 MG/DL (ref 0–1)
BUN SERPL-MCNC: 6 MG/DL (ref 7–20)
CALCIUM SERPL-MCNC: 9.7 MG/DL (ref 8.3–10.6)
CANNABINOIDS UR QL SCN>50 NG/ML: POSITIVE
CHLORIDE SERPL-SCNC: 103 MMOL/L (ref 99–110)
CO2 SERPL-SCNC: 27 MMOL/L (ref 21–32)
COCAINE UR QL SCN: ABNORMAL
CREAT SERPL-MCNC: 0.8 MG/DL (ref 0.9–1.3)
DEPRECATED RDW RBC AUTO: 13.4 % (ref 12.4–15.4)
DRUG SCREEN COMMENT UR-IMP: ABNORMAL
EKG ATRIAL RATE: 65 BPM
EKG DIAGNOSIS: NORMAL
EKG P AXIS: 35 DEGREES
EKG P-R INTERVAL: 136 MS
EKG Q-T INTERVAL: 372 MS
EKG QRS DURATION: 82 MS
EKG QTC CALCULATION (BAZETT): 386 MS
EKG R AXIS: 34 DEGREES
EKG T AXIS: 28 DEGREES
EKG VENTRICULAR RATE: 65 BPM
EOSINOPHIL # BLD: 0.4 K/UL (ref 0–0.6)
EOSINOPHIL NFR BLD: 4.3 %
ETHANOLAMINE SERPL-MCNC: NORMAL MG/DL (ref 0–0.08)
FENTANYL SCREEN, URINE: ABNORMAL
GFR SERPLBLD CREATININE-BSD FMLA CKD-EPI: >60 ML/MIN/{1.73_M2}
GLUCOSE SERPL-MCNC: 96 MG/DL (ref 70–99)
HCT VFR BLD AUTO: 49.9 % (ref 40.5–52.5)
HGB BLD-MCNC: 17 G/DL (ref 13.5–17.5)
LYMPHOCYTES # BLD: 3.2 K/UL (ref 1–5.1)
LYMPHOCYTES NFR BLD: 32.1 %
MCH RBC QN AUTO: 30.6 PG (ref 26–34)
MCHC RBC AUTO-ENTMCNC: 34 G/DL (ref 31–36)
MCV RBC AUTO: 90.1 FL (ref 80–100)
METHADONE UR QL SCN>300 NG/ML: ABNORMAL
MONOCYTES # BLD: 0.5 K/UL (ref 0–1.3)
MONOCYTES NFR BLD: 4.8 %
NEUTROPHILS # BLD: 5.7 K/UL (ref 1.7–7.7)
NEUTROPHILS NFR BLD: 57.9 %
OPIATES UR QL SCN>300 NG/ML: ABNORMAL
OXYCODONE UR QL SCN: ABNORMAL
PCP UR QL SCN>25 NG/ML: ABNORMAL
PH UR STRIP: 6 [PH]
PLATELET # BLD AUTO: 212 K/UL (ref 135–450)
PMV BLD AUTO: 8.9 FL (ref 5–10.5)
POTASSIUM SERPL-SCNC: 4.8 MMOL/L (ref 3.5–5.1)
PROT SERPL-MCNC: 7.7 G/DL (ref 6.4–8.2)
RBC # BLD AUTO: 5.54 M/UL (ref 4.2–5.9)
SALICYLATES SERPL-MCNC: 5 MG/DL (ref 15–30)
SODIUM SERPL-SCNC: 138 MMOL/L (ref 136–145)
WBC # BLD AUTO: 9.9 K/UL (ref 4–11)

## 2023-12-02 PROCEDURE — 80179 DRUG ASSAY SALICYLATE: CPT

## 2023-12-02 PROCEDURE — 93010 ELECTROCARDIOGRAM REPORT: CPT | Performed by: INTERNAL MEDICINE

## 2023-12-02 PROCEDURE — 80143 DRUG ASSAY ACETAMINOPHEN: CPT

## 2023-12-02 PROCEDURE — 36415 COLL VENOUS BLD VENIPUNCTURE: CPT

## 2023-12-02 PROCEDURE — 80053 COMPREHEN METABOLIC PANEL: CPT

## 2023-12-02 PROCEDURE — 80307 DRUG TEST PRSMV CHEM ANLYZR: CPT

## 2023-12-02 PROCEDURE — 99285 EMERGENCY DEPT VISIT HI MDM: CPT

## 2023-12-02 PROCEDURE — 1240000000 HC EMOTIONAL WELLNESS R&B

## 2023-12-02 PROCEDURE — 85025 COMPLETE CBC W/AUTO DIFF WBC: CPT

## 2023-12-02 PROCEDURE — 93005 ELECTROCARDIOGRAM TRACING: CPT | Performed by: EMERGENCY MEDICINE

## 2023-12-02 PROCEDURE — 82077 ASSAY SPEC XCP UR&BREATH IA: CPT

## 2023-12-02 RX ORDER — ACETAMINOPHEN 325 MG/1
650 TABLET ORAL EVERY 6 HOURS PRN
Status: DISCONTINUED | OUTPATIENT
Start: 2023-12-02 | End: 2023-12-05 | Stop reason: HOSPADM

## 2023-12-02 RX ORDER — TRAZODONE HYDROCHLORIDE 50 MG/1
50 TABLET ORAL NIGHTLY PRN
Status: DISCONTINUED | OUTPATIENT
Start: 2023-12-02 | End: 2023-12-03

## 2023-12-02 RX ORDER — HYDROXYZINE 50 MG/1
50 TABLET, FILM COATED ORAL 3 TIMES DAILY PRN
Status: DISCONTINUED | OUTPATIENT
Start: 2023-12-02 | End: 2023-12-05 | Stop reason: HOSPADM

## 2023-12-02 ASSESSMENT — PATIENT HEALTH QUESTIONNAIRE - PHQ9
SUM OF ALL RESPONSES TO PHQ QUESTIONS 1-9: 9
10. IF YOU CHECKED OFF ANY PROBLEMS, HOW DIFFICULT HAVE THESE PROBLEMS MADE IT FOR YOU TO DO YOUR WORK, TAKE CARE OF THINGS AT HOME, OR GET ALONG WITH OTHER PEOPLE: 2
SUM OF ALL RESPONSES TO PHQ9 QUESTIONS 1 & 2: 4
SUM OF ALL RESPONSES TO PHQ QUESTIONS 1-9: 11
5. POOR APPETITE OR OVEREATING: 1
SUM OF ALL RESPONSES TO PHQ QUESTIONS 1-9: 11
6. FEELING BAD ABOUT YOURSELF - OR THAT YOU ARE A FAILURE OR HAVE LET YOURSELF OR YOUR FAMILY DOWN: 1
9. THOUGHTS THAT YOU WOULD BE BETTER OFF DEAD, OR OF HURTING YOURSELF: 2
8. MOVING OR SPEAKING SO SLOWLY THAT OTHER PEOPLE COULD HAVE NOTICED. OR THE OPPOSITE, BEING SO FIGETY OR RESTLESS THAT YOU HAVE BEEN MOVING AROUND A LOT MORE THAN USUAL: 0
SUM OF ALL RESPONSES TO PHQ QUESTIONS 1-9: 11
3. TROUBLE FALLING OR STAYING ASLEEP: 1
2. FEELING DOWN, DEPRESSED OR HOPELESS: 2
1. LITTLE INTEREST OR PLEASURE IN DOING THINGS: 2
4. FEELING TIRED OR HAVING LITTLE ENERGY: 1
7. TROUBLE CONCENTRATING ON THINGS, SUCH AS READING THE NEWSPAPER OR WATCHING TELEVISION: 1

## 2023-12-02 ASSESSMENT — SLEEP AND FATIGUE QUESTIONNAIRES
DO YOU USE A SLEEP AID: NO
AVERAGE NUMBER OF SLEEP HOURS: 8
DO YOU HAVE DIFFICULTY SLEEPING: NO

## 2023-12-02 ASSESSMENT — LIFESTYLE VARIABLES
HOW OFTEN DO YOU HAVE A DRINK CONTAINING ALCOHOL: NEVER
HOW MANY STANDARD DRINKS CONTAINING ALCOHOL DO YOU HAVE ON A TYPICAL DAY: PATIENT DOES NOT DRINK

## 2023-12-02 ASSESSMENT — PAIN - FUNCTIONAL ASSESSMENT: PAIN_FUNCTIONAL_ASSESSMENT: NONE - DENIES PAIN

## 2023-12-02 NOTE — ED NOTES
Spoke with patient mother to obtain details of medication patient took for Dr. Mother was at work but was going to call back with information if anyone was home.      Elda Downey RN  12/02/23 7775

## 2023-12-02 NOTE — ED NOTES
Violetar attempted to call pt's mom Юлия Lewis at [phone]. She did not answer, writer left a voice message with phone number to PEDRO [facility]UNTER

## 2023-12-02 NOTE — ED NOTES
Belle from poison control called for update on patient condition to close out her report     Pato Gamboa RN  12/02/23 4801

## 2023-12-02 NOTE — ED NOTES
Pt denies SI when asked the screening questions. Pt admits to intentionally taking the medications. When asked why he took them he replies \"I don't know\".      Reji Cisneros RN  12/02/23 3297

## 2023-12-02 NOTE — PROGRESS NOTES
Patient arrived to the unit via wheelchair accompanied by security and ED staff. He was shown to assigned room. He is pleasant and cooperative at this time.

## 2023-12-02 NOTE — ED NOTES
Presenting Problem: Patient presents to ED on a SOB after police responded to pt's residence for an intentional overdose. Per hold pt is dealing with relationship issues. Per hold pt stated to police officers he took 13 of his fluoxetine to end his life. Pt states a lot of people online want him to die so he took 15 of his fluoxetine. Pt states he does not want to die and denies suicidal ideations, plan, and intent. Pt states he took them to calm down. He reports his aunt called 46. He reports people are bullying him online. He reports he did make a post on online about killing himself. Appearance/Hygiene:  well-appearing, hospital attire, seated in bed, fair grooming, and fair hygiene   Motor Behavior: WNL   Attitude: cooperative  Affect: flat affect   Speech: normal pitch and normal volume  Mood: within normal limits   Thought Processes: [place]  Perceptions: Absent   Thought content:  future oriented  Orientation: A&Ox4   Memory: intact  Concentration: Good    Insight/ judgement: impaired judgment and insight       Psychosocial and contextual factors: Pt reports he lives with his mom and aunt. He reports he was suppose to start his job at [employer] today. He reports he is getting bullied online and that is why he took 13 of his fluoxetine. Pt reports he has support from his mom and just about anyone. He reports his appetite has been good. Pt states he gets about 6-8 hours of sleep a night. He reports he smokes one cigarette a day and states he is trying to quit. C-SSRS flowsheet is  Complete. Psychiatric History (including current outpatient provider and past inpatient admissions): Pt has been admitted to [facility] in the past. Pt was admitted to [facility] 2/16/2022. Pt reports he is connected to [facility] and states he receives case management and psychiatric services there. He reports he saw Candice Stoner last Wednesday and reports he will see her again sometime in December.  He reports his

## 2023-12-02 NOTE — ED NOTES
Aunt called in verified dosage of medication patient is prescribed.  Dr. Willa Roberts RN  12/02/23 8439

## 2023-12-02 NOTE — ED NOTES
Level of Care Disposition: admit     Patient was seen by ED provider and Nursing/SW staff. The case presented to psychiatric provider on-call . Based on the ED evaluation and information presented to the provider by this writer it is the recommendation of the on call psychiatric provider that inpatient hospitalization is the least restrictive environment for the patient at this time. The patient will be admitted to the inpatient unit.             Insurance Pre certification Authorization: Medicare parts 200 [facility]

## 2023-12-02 NOTE — ED PROVIDER NOTES
[address] [employer]      Pt Name: Natalia Hirsch  MRN: [id number]  [facility] 2000  Date of evaluation: 12/2/2023  Provider: Deb Batres MD    CHIEF COMPLAINT       Chief Complaint   Patient presents with    Psychiatric Evaluation     Pt brought in by EMS with police and placed on hold, EMS states pt took 15 of his prozac pills in a suicide attempt, total of 300 mg, states took them around 1000, states has some problems going on with social media causing the SI         HISTORY OF PRESENT ILLNESS   (Location/Symptom, Timing/Onset, Context/Setting, Quality, Duration, Modifying Factors, Severity)  Note limiting factors. Natalia Hirsch is a 21 y.o. male with past medical history of seizure disorder, ADHD, depression, pervasive developmental disorder here today after suicide attempt. Patient states he tried to commit suicide today because he saw online that people were stating that he was a pedophile. He states this made him upset, and he wanted to kill himself. Notes he took a total of 15, 20 mg fluoxetine tablets. Immediate release. States he feels fine. No acute complaints other than feeling depressed. States he is upset because he is not a pedophile. HPI    Nursing Notes were reviewed. REVIEW OF SYSTEMS    (2-9 systems for level 4, 10 or more for level 5)     Review of Systems    Please see HPI for pertinent positive and negative review of system findings. A full 10 system ROS was performed and otherwise negative.         PAST MEDICAL HISTORY     Past Medical History:   Diagnosis Date    ADHD (attention deficit hyperactivity disorder)     Major depressive disorder     Pervasive developmental disorder          SURGICAL HISTORY       Past Surgical History:   Procedure Laterality Date    PILONIDAL CYST EXCISION N/A 5/5/2022    EXCISION OF PILONIDAL CYST performed by Durga Polanco MD at [address] [facility]       Current Discharge

## 2023-12-02 NOTE — ED TRIAGE NOTES
Chief Complaint   Patient presents with    Psychiatric Evaluation     Pt brought in by EMS with police and placed on hold, EMS states pt took 15 of his prozac pills in a suicide attempt, total of 300 mg, states took them around 1000, [facility] has some problems going on with social media causing the SI

## 2023-12-03 PROBLEM — T43.222A INTENTIONAL SELECTIVE SEROTONIN REUPTAKE INHIBITOR OVERDOSE (HCC): Status: ACTIVE | Noted: 2023-12-03

## 2023-12-03 PROBLEM — F12.20 CANNABIS USE DISORDER, MODERATE, DEPENDENCE (HCC): Status: ACTIVE | Noted: 2023-12-03

## 2023-12-03 PROBLEM — T39.1X2A INTENTIONAL ACETAMINOPHEN OVERDOSE (HCC): Status: ACTIVE | Noted: 2023-12-03

## 2023-12-03 PROCEDURE — 99221 1ST HOSP IP/OBS SF/LOW 40: CPT | Performed by: NURSE PRACTITIONER

## 2023-12-03 PROCEDURE — 1240000000 HC EMOTIONAL WELLNESS R&B

## 2023-12-03 PROCEDURE — 6370000000 HC RX 637 (ALT 250 FOR IP): Performed by: PSYCHIATRY & NEUROLOGY

## 2023-12-03 PROCEDURE — 99223 1ST HOSP IP/OBS HIGH 75: CPT | Performed by: PSYCHIATRY & NEUROLOGY

## 2023-12-03 RX ORDER — ARIPIPRAZOLE 2 MG/1
2 TABLET ORAL DAILY
Status: DISCONTINUED | OUTPATIENT
Start: 2023-12-03 | End: 2023-12-05 | Stop reason: HOSPADM

## 2023-12-03 RX ORDER — FLUOXETINE 10 MG/1
10 CAPSULE ORAL DAILY
Status: DISCONTINUED | OUTPATIENT
Start: 2023-12-03 | End: 2023-12-05 | Stop reason: HOSPADM

## 2023-12-03 RX ORDER — CLONIDINE HYDROCHLORIDE 0.1 MG/1
0.1 TABLET ORAL NIGHTLY PRN
Status: DISCONTINUED | OUTPATIENT
Start: 2023-12-03 | End: 2023-12-05 | Stop reason: HOSPADM

## 2023-12-03 RX ADMIN — ARIPIPRAZOLE 2 MG: 2 TABLET ORAL at 12:08

## 2023-12-03 RX ADMIN — FLUOXETINE 10 MG: 10 CAPSULE ORAL at 12:08

## 2023-12-03 ASSESSMENT — PATIENT HEALTH QUESTIONNAIRE - PHQ9
SUM OF ALL RESPONSES TO PHQ QUESTIONS 1-9: 7
10. IF YOU CHECKED OFF ANY PROBLEMS, HOW DIFFICULT HAVE THESE PROBLEMS MADE IT FOR YOU TO DO YOUR WORK, TAKE CARE OF THINGS AT HOME, OR GET ALONG WITH OTHER PEOPLE: 1
6. FEELING BAD ABOUT YOURSELF - OR THAT YOU ARE A FAILURE OR HAVE LET YOURSELF OR YOUR FAMILY DOWN: 1
SUM OF ALL RESPONSES TO PHQ QUESTIONS 1-9: 8
3. TROUBLE FALLING OR STAYING ASLEEP: 1
9. THOUGHTS THAT YOU WOULD BE BETTER OFF DEAD, OR OF HURTING YOURSELF: 1
4. FEELING TIRED OR HAVING LITTLE ENERGY: 1
7. TROUBLE CONCENTRATING ON THINGS, SUCH AS READING THE NEWSPAPER OR WATCHING TELEVISION: 1
1. LITTLE INTEREST OR PLEASURE IN DOING THINGS: 2
8. MOVING OR SPEAKING SO SLOWLY THAT OTHER PEOPLE COULD HAVE NOTICED. OR THE OPPOSITE, BEING SO FIGETY OR RESTLESS THAT YOU HAVE BEEN MOVING AROUND A LOT MORE THAN USUAL: 0
5. POOR APPETITE OR OVEREATING: 1

## 2023-12-03 ASSESSMENT — SLEEP AND FATIGUE QUESTIONNAIRES
AVERAGE NUMBER OF SLEEP HOURS: 8
DO YOU USE A SLEEP AID: NO
DO YOU HAVE DIFFICULTY SLEEPING: NO

## 2023-12-03 NOTE — PROGRESS NOTES
4 Eyes Skin Assessment     NAME:  Lillian Yates  YOB: 2000  MEDICAL RECORD NUMBER:  [id number]    The patient is being assessed for  Admission    I agree that at least one RN has performed a thorough Head to Toe Skin Assessment on the patient. ALL assessment sites listed below have been assessed. Areas assessed by both nurses:    Head, Face, Ears, Shoulders, Back, Chest, Arms, Elbows, Hands, Sacrum. Buttock, Coccyx, Ischium, and Legs. Feet and Heels        Does the Patient have a Wound? No noted wound(s)  With IV wing on his left antecubital area, no swelling nor bleeding noted, no other fresh injury noted nor reported.        Carlos Prevention initiated by RN: No  Wound Care Orders initiated by RN: No    Pressure Injury (Stage 3,4, Unstageable, DTI, NWPT, and Complex wounds) if present, place Wound referral order by RN under : No    New Ostomies, if present place, Ostomy referral order under : No     Nurse 1 eSignature: Electronically signed by Brigitte Hill RN on 12/2/23 at 10:05 PM EST    **SHARE this note so that the co-signing nurse can place an eSignature**    Nurse 2 eSignature: Electronically signed by Jarad Smith RN on 12/2/23 at 10:07 PM EST

## 2023-12-03 NOTE — PROGRESS NOTES
CSSR-S Assessment completed with patient who then scored HIGH RISK. Provider Dr. Charlene Brannon was notified of HIGH RISK score, via Telephone at [address] [place] [address]. Were suicide precautions ordered: YES    Pt denies current SI/HI/VH/AH. He understand he is on a locked unit, patient's plan was to overdose on his home medications which he does not have access to while  on the unit. Patient agrees to communicate with staff if no longer feel safe or in control. Provider notified and verbal order given to discontinue Suicide Precautions and Constant observation at this time.       Completed by: Mamta Colmenares RN

## 2023-12-03 NOTE — PROGRESS NOTES
Home Medication Reconciliation Status          [] COMPLETE       Medication history has been reviewed and obtained from the following source(s):       [] patient/family verbal report             [] patient/family provided written list       [] external pharmacy   [] external facility list         []  Provider notified that home medication reconciliation is complete          [x] IN PROGRESS       Medication reconciliation marked in progress at this time due to:       [] patient/family poor historian      [] waiting arrival of family to clarify       [] waiting for accurate list        [x] external pharmacy needs called      * Follow up is needed. [] UNABLE TO ASSESS       Medication reconciliation is incomplete and unable to assess at this time due to:       [] critical patient condition   [] patient is unresponsive        [] no family available                       [] unknown pharmacy       [] anonymous patient          * Follow up is needed.       [] Pharmacy consult placed for medication reconciliation assistance   Additional comments:

## 2023-12-03 NOTE — PROGRESS NOTES
[facility]  Admission Note     Admission Type:   Admission Type: Voluntary    Reason for admission:  Reason for Admission: Intentional overdose on Prozac, suicidal ideation on      Addictive Behavior:   Addictive Behavior  In the Past 3 Months, Have You Felt or Has Someone Told You That You Have a Problem With  : None    Medical Problems:   Past Medical History:   Diagnosis Date    ADHD (attention deficit hyperactivity disorder)     Major depressive disorder     Pervasive developmental disorder        Status EXAM:  Mental Status and Behavioral Exam  Normal: No  Level of Assistance: Independent/Self  Facial Expression: Avoids Gaze, Flat, Sad, Expressionless  Affect: Constricted  Level of Consciousness: Alert  Frequency of Checks: 4 times per hour, close  Mood:Normal: No  Mood: Depressed, Sad  Motor Activity:Normal: No  Motor Activity: Decreased  Eye Contact: Fair  Observed Behavior: Withdrawn, Cooperative, Guarded  Sexual Misconduct History: Current - no  Preception: Jessie to person, Jessie to time, Jessie to place, Jessie to situation  Attention:Normal: No  Attention: Distractible  Thought Processes: Blocking  Thought Content:Normal: Yes  Depression Symptoms: Change in energy level, Feelings of worthlessness, Impaired concentration, Isolative, Loss of interest  Anxiety Symptoms: Generalized  Eleanor Symptoms: Poor judgment  Hallucinations: None  Delusions: No  Memory:Normal: Yes  Insight and Judgment: No  Insight and Judgment: Poor judgment, Poor insight    Tobacco Screening:  Practical Counseling, on admission, wing X, if applicable and completed (first 3 are required if patient doesn't refuse):            (X) Recognizing danger situations (included triggers and roadblocks)                    (X) Coping skills (new ways to manage stress,relaxation techniques, changing routine, distraction)                                                           (X) Basic information about quitting (benefits of

## 2023-12-03 NOTE — PROGRESS NOTES
Behavioral Services  Medicare Certification Upon Admission    I certify that this patient's inpatient psychiatric hospital admission is medically necessary for:    [x] (1) Treatment which could reasonably be expected to improve this patient's condition,       [x] (2) Or for diagnostic study;     AND     [x](2) The inpatient psychiatric services are provided while the individual is under the care of a physician and are included in the individualized plan of care.     Estimated length of stay/service 2-3 days    Plan for post-hospital care incomplete     Electronically signed by Natalia Fernandes MD on 12/3/2023 at 9:16 AM

## 2023-12-03 NOTE — GROUP NOTE
Group Therapy Note    Date: 12/3/2023    Group Start Time: 0915  Group End Time: 1194  Group Topic: Recreational    [facility] [place]        Group Therapy Note    Attendees: 6       Patient's Goal:  Pt to engage in group discussion talking about the patient personality and what is needed to make the patient feel secure. Discussed expression of feelings, emotion and communication skills     Notes:  Pt was able to engage in the group activity. Pt was able to express what makes him feel complete as a person and what he is lacking. Pt was able to give insight to others. Status After Intervention:  Improved    Participation Level:  Active Listener    Participation Quality: Appropriate      Speech:  normal      Thought Process/Content: Logical      Affective Functioning: Congruent      Mood: depressed      Level of consciousness:  Alert and Oriented x4      Response to Learning: Able to verbalize current knowledge/experience and Capable of insight      Endings: None Reported    Modes of Intervention: Education and Activity      Discipline Responsible: /Counselor      Signature:  HUNG Pelletier

## 2023-12-03 NOTE — BH NOTE
Pt calm and cooperative w/ VS. Denied thoughts of self harm and agrees to come to staff if he feels this changes. Denies that he took the prozac in an attempt to harm himself. Alert and oriented x 4, [facility], states only currently takes Prozac and clonidine and did take today. Pt reports he vapes with nicotine daily. Sitter w/ pt at this time.

## 2023-12-03 NOTE — CARE COORDINATION
12/03/23 0833   Psychiatric History   Psychiatric history treatment   (States \"People on facebook were talking crap about me\". States he had thoughts of hurting self)   Contact information No PCP, Connected with [facility] for MH   Are there any medication issues? No   Recent Psychological Experiences Conflict (comment)   Support System   Support system Adequate   Types of Support System Mother;Grandmother;Uncle   Problems in support system Isolated   Current Living Situation   Home Living Adequate   Living information Lives with others  (Mom, Filipe, Bo Leyden and a Friend)   Problems with living situation  No   Lack of basic needs No   SSDI/SSI SSI   Other government assistance Food stamps   Problems with environment NA   Current abuse issues NA   Relationship problems No   Contact information Mom   Medical and Self-Care Issues   Relevant medical problems depression, mood disorder. States ther is nothing wrong physcially   Relevant self-care issues NA   Barriers to treatment No   Family Constellation   Spouse/partner-name/age NA   Children-names/ages NA   Parents Mom- Takes care of pt. Dad is deseased   Siblings \"a couple of brothers and sisters\". limited contact   Contact information NA   Comment NA   Childhood   Raised by Biological mother   Biological mother Good upbringing   Relevant family history NA   History of abuse No   Legal History   Legal history No   Juvenile legal history No   Abuse Assessment   Physical Abuse Denies   Verbal Abuse Denies   Emotional abuse Denies   Financial Abuse Denies   Sexual abuse Denies   Possible abuse reported to None needed   Substance Use   Use of substances  No   Motivation for SA Treatment   Motivation for treatment No   Education   Education HS graduate -GED   Work History   Currently employed Yes  ([employer])   Cultural and Spiritual   Spiritual concerns No   Cultural concerns No     Completed assessment, AT/OT and CSSR-S.  Pt has no PCP but is connected to [facility] for mental health

## 2023-12-03 NOTE — PROGRESS NOTES
Checked on patient at this time to do the admission, patient sleeping at this time. To check again with the patient  later.

## 2023-12-03 NOTE — PROGRESS NOTES
Checked on patient, introduced self & informed that this writer will be doing an assessment & admission works with this patient later on, patient denies any SI/HI/AVH, feels safe in the unit, agrees to inform this writer or any of the staff if with any safety risk or becomes suicidal.

## 2023-12-03 NOTE — H&P
Hospital Medicine History & Physical      PCP: Luh Pelayo DO    Date of Admission: 12/2/2023    Date of Service: Pt seen/examined on 12/03/23      Chief Complaint:    Chief Complaint   Patient presents with    Psychiatric Evaluation     Pt brought in by EMS with police and placed on hold, EMS states pt took 15 of his prozac pills in a suicide attempt, total of 300 mg, states took them around 1000, states has some problems going on with social media causing the SI         History Of Present Illness: The patient is a 21 y.o. male with PMH of ADHD, Depression, developmental disorder who presented to [facility] for intentional overdose. Patient was seen and evaluated in the ED by the ED medical provider, patient was medically cleared for admission to [facility] at [facility]. This note serves as an admission medical H&P. Tobacco use: denies  ETOH use: denies  Illicit drug use: yes, marijuana     Patient denies any medical complaints     Past Medical History:        Diagnosis Date    ADHD (attention deficit hyperactivity disorder)     Major depressive disorder     Pervasive developmental disorder        Past Surgical History:        Procedure Laterality Date    PILONIDAL CYST EXCISION N/A 5/5/2022    EXCISION OF PILONIDAL CYST performed by Bossman Oliver MD at [address] [place] [address]       Medications Prior to Admission:    Prior to Admission medications    Medication Sig Start Date End Date Taking? Authorizing Provider   FLUoxetine (PROZAC) 10 MG capsule Take 1 capsule by mouth daily 3/16/22   ProviderKathy MD   cloNIDine (CATAPRES) 0.1 MG tablet TAKE 1 TABLET BY MOUTH AT BEDTIME AS NEEDED FOR SLEEP 3/16/22   ProviderKathy MD   ARIPiprazole (ABILIFY) 2 MG tablet Take 1 tablet by mouth daily 2/19/22   CAM Palacios CNP       Allergies:  Latex and Fish-derived products    Social History:  The patient currently lives home     TOBACCO:   reports that he has never smoked.  He has never used smokeless
with  depression. Previous medication trials include Wellbutrin and Vyvanse. SUBSTANCE USE HISTORY:  Daily cannabis. No others. No treatment  programs. PAST MEDICAL HISTORY:  \"Prediabetic. \"  No traumatic brain injuries. He  says he had seizures the last time he was here and it looks like that  was in the setting of an overdose, none since. He has never had a major  surgery. FAMILY PSYCHIATRIC HISTORY:  None. No suicides. MEDICATIONS:  Fluoxetine 10 mg daily, Abilify 2 mg daily, clonidine  nightly as needed. He gets them through [facility]. ALLERGIES:  No known drug allergies. SOCIAL HISTORY:  Born in [place], three siblings, parents were . He graduated high school. He has never been . He has no kids. He lives with his mother, uncle, and his mother's friend. He is on  social security, but says he was scheduled to start working at [employer]  yesterday. LEGAL HISTORY:  None. REVIEW OF SYSTEMS:  He is partially vaccinated for COVID. He does not  describe or endorse recent headache, change of vision, chest pain,  shortness of breath, cough, sore throat, fevers, abdominal pain,  neurological problems, bleeding problems, or skin problems. He moves  and speaks about difficulty. MENTAL STATUS EXAMINATION:  He presented in a hospital gown. He was  guarded but open with conversation. He made limited eye contact. He  described his mood as \"better\" and had a flat affect. He had a mild  psychomotor retardation. He spoke softly. He was not pressured. He was oriented to the date, day,  and place and the context of this evaluation. His memory was intact. His use of language, speech and educational attainment suggested a below  average level of intellectual functioning. His thought processes were organized and goal directed. He did not  describe or endorse delusions, hallucinations, homicidal thinking or  suicidal thinking. He reported feeling safe here.     His

## 2023-12-04 PROCEDURE — 1240000000 HC EMOTIONAL WELLNESS R&B

## 2023-12-04 PROCEDURE — 99232 SBSQ HOSP IP/OBS MODERATE 35: CPT

## 2023-12-04 PROCEDURE — 6370000000 HC RX 637 (ALT 250 FOR IP): Performed by: PSYCHIATRY & NEUROLOGY

## 2023-12-04 RX ADMIN — CLONIDINE HYDROCHLORIDE 0.1 MG: 0.1 TABLET ORAL at 20:31

## 2023-12-04 RX ADMIN — FLUOXETINE 10 MG: 10 CAPSULE ORAL at 08:52

## 2023-12-04 RX ADMIN — ARIPIPRAZOLE 2 MG: 2 TABLET ORAL at 08:52

## 2023-12-04 NOTE — DISCHARGE INSTRUCTIONS
Advanced Directives:  Patient does not have a surrogate decision maker appointed   Name (if yes): N/A Phone Number: N/A  Patient does not have a psychiatric and/ or medical advanced directive or power of . Patient was offered psychiatric and/ or medical advanced directive or power of  information/completion but declined to complete   Why not? N/A    Discharge Planning is Complete. Discharge Date: 12/5/23  Reason for Hospitalization: This is a domiciled, never , and disabled 24-year-old with a history of depression and possible intellectual disability, who was brought to our emergency department by squad after he intentionally overdosed on 15 of his fluoxetine tablets. Discharge Diagnosis: Depression, unspecified   Discharging to: Home    Your instructions: Your physician here was Julia Oates MD. If you have any questions please call the unit at [phone] for the adult unit or [phone] for [facility]. Please note that we have a patient family advisory [place] that meets the second Wednesday of January and the second Wednesday of July at [address] [place] [address] in [place] at [facility]. Department leadership would love for you to attend to give feedback on what we are doing well and areas in which we can improve our patient care. Please come if you are able and feel free to reach out to [address] [place] [address] at [phone] with any questions. The crisis number for [facility] is [phone] (SAVE). This crisis line is available 24 hours a day, seven days a week. Please follow up with your PCP regarding any pending labs. You are connected to [facility]. They provide you with mental health services and manage your appointments. Ki Martinez came to meet with you while on the unit.  She will pick you up upon discharge   Name of Provider: Ki Martinez  Provider specialty/license: mental health services  Date and time of appointment: 12/5/23 Ki Martinez will be picking you

## 2023-12-04 NOTE — GROUP NOTE
Group Therapy Note    Date: 12/4/2023    Group Start Time: 1000  Group End Time: 8799  Group Topic: Psychoeducation    [facility]    HUNG Ambriz        Group Therapy Note    Attendees: 15    Facilitator led a psychoeducation group that explored cognitive distortions and how they influence emotions and behaviors. Patient discussed how cognitive distortions are irrational or exaggerated thought patterns contributing to emotional distress and mental health issues, and the significance of being able to identify when a distortion occurs. Patients were introduced to eight different types of cognitive distortions and practiced identifying there own types of cognitive distortions. Notes:  Patient was attentive during educational presentation, engaged in group discussion, and participated in thought identification activity. Status After Intervention:  Improved    Participation Level:  Active Listener and Interactive    Participation Quality: Appropriate, Attentive, and Sharing      Speech:  normal      Thought Process/Content: Logical  Linear      Affective Functioning: Congruent      Mood: euthymic      Level of consciousness:  Alert and Attentive      Response to Learning: Able to verbalize current knowledge/experience, Able to verbalize/acknowledge new learning, Able to retain information, Capable of insight, Able to change behavior, and Progressing to goal      Endings: None Reported    Modes of Intervention: Education and Exploration      Discipline Responsible: /Counselor      Signature:  ELSA Ambriz

## 2023-12-04 NOTE — BH NOTE
Writer met with patient and his , Ki Martinez. Writer got Racheal's info for when patient discharges.

## 2023-12-05 VITALS
HEIGHT: 72 IN | OXYGEN SATURATION: 96 % | WEIGHT: 240 LBS | HEART RATE: 71 BPM | SYSTOLIC BLOOD PRESSURE: 133 MMHG | RESPIRATION RATE: 16 BRPM | DIASTOLIC BLOOD PRESSURE: 66 MMHG | TEMPERATURE: 97.6 F | BODY MASS INDEX: 32.51 KG/M2

## 2023-12-05 PROCEDURE — 6370000000 HC RX 637 (ALT 250 FOR IP): Performed by: PSYCHIATRY & NEUROLOGY

## 2023-12-05 PROCEDURE — 99239 HOSP IP/OBS DSCHRG MGMT >30: CPT

## 2023-12-05 PROCEDURE — 5130000000 HC BRIDGE APPOINTMENT

## 2023-12-05 RX ADMIN — FLUOXETINE 10 MG: 10 CAPSULE ORAL at 08:27

## 2023-12-05 RX ADMIN — HYDROXYZINE HYDROCHLORIDE 50 MG: 50 TABLET, FILM COATED ORAL at 00:44

## 2023-12-05 RX ADMIN — ARIPIPRAZOLE 2 MG: 2 TABLET ORAL at 08:27

## 2023-12-05 NOTE — PLAN OF CARE
[facility]  Treatment Team Note  Review Date & Time: 12/03/23  0930    Patient was not in treatment team      Status EXAM:   Mental Status and Behavioral Exam  Normal: No  Level of Assistance: Independent/Self  Facial Expression: Avoids Gaze, Flat  Affect: Constricted  Level of Consciousness: Alert  Frequency of Checks: 4 times per hour, close  Mood:Normal: No  Mood: Depressed  Motor Activity:Normal: No  Motor Activity: Decreased  Eye Contact: Fair  Observed Behavior: Withdrawn  Sexual Misconduct History: Current - no  Preception: [place] to person, [place] to time, [place] to situation  Attention:Normal: No  Attention: Distractible  Thought Processes: Blocking  Thought Content:Normal: Yes  Depression Symptoms: Change in energy level  Anxiety Symptoms: Generalized  Eleanor Symptoms: No problems reported or observed. Hallucinations: None  Delusions: No  Memory:Normal: Yes  Insight and Judgment: No  Insight and Judgment: Poor judgment, Poor insight      Suicide Risk CSSR-S:  1) Within the past month, have you wished you were dead or wished you could go to sleep and not wake up? : Yes  2) Have you actually had any thoughts of killing yourself? : Yes  3) Have you been thinking about how you might kill yourself? : Yes  5) Have you started to work out or worked out the details of how to kill yourself? Do you intend to carry out this plan? : Yes  6) Have you ever done anything, started to do anything, or prepared to do anything to end your life?: Yes      PLAN/TREATMENT RECOMMENDATIONS UPDATE:   Patient will take medication as prescribed, eat 75% of meals, attend groups, participate in milieu activities, participate in treatment team and care planning for discharge and follow up.            Angelica Silva RN
CSSR-S Assessment completed with patient who then scored HIGH RISK. Provider Dr. Leesa Santillan was notified of HIGH RISK score, via Telephone at [address] [place] [address].       Were suicide precautions ordered: YES    If not ordered, justification as follows: n/a    Completed by: Hoda Morris RN
Patient appears physically well, alert & oriented. Able to cooperate during care. During 1-1 interaction, appears guarded, responded to queries but needs time & responds in short brief answers. Denies SI/HI/AVH, reports difficulty maintaining sleep last night but appears asleep most of the shift last night encouraged to inform the staff if with any issues with sleep. Patients agreed & verbalized understanding. Safe environment maintained. Problem: Coping  Goal: Pt/Family able to verbalize concerns and demonstrate effective coping strategies  Description: INTERVENTIONS:  1. Assist patient/family to identify coping skills, available support systems and cultural and spiritual values  2. Provide emotional support, including active listening and acknowledgement of concerns of patient and caregivers  3. Reduce environmental stimuli, as able  4. Instruct patient/family in relaxation techniques, as appropriate  5. Assess for spiritual pain/suffering and initiate Spiritual Care, Psychosocial Clinical Specialist consults as needed  12/3/2023 2152 by Valentine Lu RN  Outcome: Progressing     Problem: Depression/Self Harm  Goal: Effect of psychiatric condition will be minimized and patient will be protected from self harm  Description: INTERVENTIONS:  1. Assess impact of patient's symptoms on level of functioning, self care needs and offer support as indicated  2. Assess patient/family knowledge of depression, impact on illness and need for teaching  3. Provide emotional support, presence and reassurance  4. Assess for possible suicidal thoughts or ideation. If patient expresses suicidal thoughts or statements do not leave alone, initiate Suicide Precautions, move to a room close to the nursing station and obtain sitter  5.  Initiate consults as appropriate with Mental Health Professional, Spiritual Care, Psychosocial CNS, and consider a recommendation to the LIP for a Psychiatric Consultation  12/3/2023
Patient appears physically well, alert & oriented. Out and visible, appears in brighter mood, more interactive, smiles when talked to. During interaction, denies SI/HI/AVH, reports that he is ready for discharge & looking forward to start working to his new job at [employer]. Attended group tonight. Clonidine 0.1mg given as PRN. Safe environment provided & maintained. Problem: Coping  Goal: Pt/Family able to verbalize concerns and demonstrate effective coping strategies  Description: INTERVENTIONS:  1. Assist patient/family to identify coping skills, available support systems and cultural and spiritual values  2. Provide emotional support, including active listening and acknowledgement of concerns of patient and caregivers  3. Reduce environmental stimuli, as able  4. Instruct patient/family in relaxation techniques, as appropriate  5. Assess for spiritual pain/suffering and initiate Spiritual Care, Psychosocial Clinical Specialist consults as needed  12/4/2023 2247 by Coral Ruano RN  Outcome: Progressing     Problem: Depression/Self Harm  Goal: Effect of psychiatric condition will be minimized and patient will be protected from self harm  Description: INTERVENTIONS:  1. Assess impact of patient's symptoms on level of functioning, self care needs and offer support as indicated  2. Assess patient/family knowledge of depression, impact on illness and need for teaching  3. Provide emotional support, presence and reassurance  4. Assess for possible suicidal thoughts or ideation. If patient expresses suicidal thoughts or statements do not leave alone, initiate Suicide Precautions, move to a room close to the nursing station and obtain sitter  5.  Initiate consults as appropriate with Mental Health Professional, Spiritual Care, Psychosocial CNS, and consider a recommendation to the LIP for a Psychiatric Consultation  12/4/2023 2247 by Coral Ruano RN  Outcome: Progressing
Patient appears physically well, alert & oriented. Pleasant & able to cooperate during care. Isolated mostly in his room. During interaction, appears, sad & guarded. He denies any current SI/HI/AVH, reports that he will tell the staff if in case any concerns for his safety arises or whenever he feels not in control. Safe environment provided & maintained. Problem: Coping  Goal: Pt/Family able to verbalize concerns and demonstrate effective coping strategies  Description: INTERVENTIONS:  1. Assist patient/family to identify coping skills, available support systems and cultural and spiritual values  2. Provide emotional support, including active listening and acknowledgement of concerns of patient and caregivers  3. Reduce environmental stimuli, as able  4. Instruct patient/family in relaxation techniques, as appropriate  5. Assess for spiritual pain/suffering and initiate Spiritual Care, Psychosocial Clinical Specialist consults as needed  Outcome: Progressing     Problem: Depression/Self Harm  Goal: Effect of psychiatric condition will be minimized and patient will be protected from self harm  Description: INTERVENTIONS:  1. Assess impact of patient's symptoms on level of functioning, self care needs and offer support as indicated  2. Assess patient/family knowledge of depression, impact on illness and need for teaching  3. Provide emotional support, presence and reassurance  4. Assess for possible suicidal thoughts or ideation. If patient expresses suicidal thoughts or statements do not leave alone, initiate Suicide Precautions, move to a room close to the nursing station and obtain sitter  5.  Initiate consults as appropriate with Mental Health Professional, Spiritual Care, Psychosocial CNS, and consider a recommendation to the LIP for a Psychiatric Consultation  Outcome: Progressing
Problem: Depression/Self Harm  Goal: Effect of psychiatric condition will be minimized and patient will be protected from self harm  Description: INTERVENTIONS:  1. Assess impact of patient's symptoms on level of functioning, self care needs and offer support as indicated  2. Assess patient/family knowledge of depression, impact on illness and need for teaching  3. Provide emotional support, presence and reassurance  4. Assess for possible suicidal thoughts or ideation. If patient expresses suicidal thoughts or statements do not leave alone, initiate Suicide Precautions, move to a room close to the nursing station and obtain sitter  5. Initiate consults as appropriate with Mental Health Professional, Spiritual Care, Psychosocial CNS, and consider a recommendation to the LIP for a Psychiatric Consultation  12/5/2023 1044 by Wilmer Jaime RN  Outcome: Progressing  12/5/2023 0913 by Wilmer Jaime RN  Outcome: Progressing  12/5/2023 0859 by Wilmer Jaime RN  Outcome: Progressing  12/4/2023 2247 by Jose Martin Bacon RN  Outcome: Progressing     Problem: Coping  Goal: Pt/Family able to verbalize concerns and demonstrate effective coping strategies  Description: INTERVENTIONS:  1. Assist patient/family to identify coping skills, available support systems and cultural and spiritual values  2. Provide emotional support, including active listening and acknowledgement of concerns of patient and caregivers  3. Reduce environmental stimuli, as able  4. Instruct patient/family in relaxation techniques, as appropriate  5.  Assess for spiritual pain/suffering and initiate Spiritual Care, Psychosocial Clinical Specialist consults as needed  12/5/2023 1044 by Wilmer Jaime RN  Outcome: Progressing  12/5/2023 0913 by Wilmer Jaime RN  Outcome: Progressing  12/5/2023 0859 by Wilmer Jaime RN  Outcome: Progressing  12/4/2023 2247 by Jose Martin Bacon RN  Outcome: Progressing
Problem: Self Harm/Suicidality  Goal: Will have no self-injury during hospital stay  Description: INTERVENTIONS:  1. Ensure constant observer at bedside with Q15M safety checks  2. Maintain a safe environment  3. Secure patient belongings  4. Ensure family/visitors adhere to safety recommendations  5. Ensure safety tray has been added to patient's diet order  6.   Every shift and PRN: Re-assess suicidal risk via Frequent Screener    12/2/2023 1937 by Paco Ambrosio, John Garcia RN  Outcome: Completed  12/2/2023 1900 by Paco Ambrosio, John Garcia, RN  Outcome: Progressing
Problem: Self Harm/Suicidality  Goal: Will have no self-injury during hospital stay  Description: INTERVENTIONS:  1. Ensure constant observer at bedside with Q15M safety checks  2. Maintain a safe environment  3. Secure patient belongings  4. Ensure family/visitors adhere to safety recommendations  5. Ensure safety tray has been added to patient's diet order  6.   Every shift and PRN: Re-assess suicidal risk via Frequent Screener    Outcome: Progressing
Pt visible on unit attends groups, more talkative and smiling on approach. Pt denies SI,HI,AVH, no distress noted at this time.
ideation. If patient expresses suicidal thoughts or statements do not leave alone, initiate Suicide Precautions, move to a room close to the nursing station and obtain sitter  5.  Initiate consults as appropriate with Mental Health Professional, Spiritual Care, Psychosocial CNS, and consider a recommendation to the LIP for a Psychiatric Consultation  Outcome: Progressing

## 2023-12-05 NOTE — PROGRESS NOTES
Bridge Appointment completed: Reviewed Discharge Instructions with patient. Patient verbalizes understanding and agreement with the discharge plan using the teachback method.      Referral for Outpatient Tobacco Cessation Counseling, upon discharge (wing X if applicable and completed):    ( )  Hospital staff assisted patient to call Quit Line or faxed referral                                   during hospitalization                  ( )  Recognizing danger situations (included triggers and roadblocks), if not completed on admission                    ( )  Coping skills (new ways to manage stress, exercise, relaxation techniques, changing routine, distraction), if not completed on admission                                                           ( X)  Basic information about quitting (benefits of quitting, techniques in how to quit, available resources, if not completed on admission  ( ) Referral for counseling faxed to [address] [place] [address]   ( ) Patient refused referral  ( ) Patient refused counseling  ( ) Patient refused smoking cessation medication upon discharge    Vaccinations (wing X if applicable and completed):  ( ) Patient states already received influenza vaccine elsewhere  ( ) Patient received influenza vaccine during this hospitalization  ( ) Patient refused influenza vaccine at this time  ( X) Not offered

## 2023-12-05 NOTE — PROGRESS NOTES
8095, patient awake, reports unable to sleep, feels worried about his grandmother, says she is on a hospital due to chest pain, Atarax 50mg given as PRN.

## 2023-12-05 NOTE — PROGRESS NOTES
Pt is visible on unit but mostly withdrawn to self. He is A&O X4. Pt denies current SI/HI/VH/AH and agrees to communicate with staff if no longer feel safe or in control. Pt states he did not sleep well and was worried about his grandma who is in the hospital for chest pain. He reports his sadness/depression has improved. 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 States he pays videos, fishes and listens to music for fun at home. He also reports good support from friends and family. Pt is already working with [facility] and talks to a psychiatrist monthly. Pt also plans on going home and deleting his facebook.

## 2023-12-05 NOTE — PROGRESS NOTES
[facility]  Discharge Note    Pt discharged with followings belongings:   Dental Appliances: None  Vision - Corrective Lenses: None  Hearing Aid: None  Jewelry: None  Body Piercings Removed: N/A  Clothing: Belt, Footwear, Hat, Jacket/Coat, Pants, Shirt, Undergarments  Other Valuables: Other (Comment), Wallet (vape)   Valuables returned to patient. Patient educated on aftercare instructions: yes  Information faxed to [address] [place] [address]  by Lori Bowman RN  at 10:50 AM .Patient verbalize understanding of AVS:  yes. Status EXAM upon discharge:  Mental Status and Behavioral Exam  Normal: Yes  Level of Assistance: Independent/Self  Facial Expression: Flat, Brightened (brightens with interaction)  Affect: Appropriate  Level of Consciousness: Alert  Frequency of Checks: 4 times per hour, close  Mood:Normal: Yes  Mood: Other (comment) (euythmic)  Motor Activity:Normal: Yes  Motor Activity: Decreased  Eye Contact: Good  Observed Behavior: Friendly, Preoccupied  Sexual Misconduct History: Current - no  Preception: [place] to person, [place] to time, [place] to place, [place] to situation  Attention:Normal: Yes  Attention: Others (comment) (pt quiet but more talkative and smiling today)  Thought Processes: Unremarkable  Thought Content:Normal: Yes  Depression Symptoms: No problems reported or observed. Anxiety Symptoms: Generalized  Eleanor Symptoms: No problems reported or observed.   Hallucinations: None  Delusions: No  Memory:Normal: Yes  Insight and Judgment: Yes  Insight and Judgment: Poor judgment, Poor insight    Tobacco Screening:  Practical Counseling, on admission, wing X, if applicable and completed (first 3 are required if patient doesn't refuse):            ( ) Recognizing danger situations (included triggers and roadblocks)                    ( ) Coping skills (new ways to manage stress,relaxation techniques, changing routine, distraction)                                                           ( ) Basic

## 2023-12-05 NOTE — GROUP NOTE
Group Therapy Note    Date: 12/4/2023    Group Start Time: 2045  Group End Time: 2115  Group Topic: Wrap-Up    [address] [place] [address]    Christina Maldonado RN        Group Therapy Note    Attendees: 10       Patient's Goal:  To use his coping skills    Notes:  States he's been working on coping skills    Status After Intervention:  Improved    Participation Level:  Active Listener and Interactive    Participation Quality: Attentive, Sharing, and Supportive      Speech:  hesitant      Thought Process/Content: Logical      Affective Functioning: Congruent      Mood: anxious      Level of consciousness:  Alert and Attentive      Response to Learning: Able to verbalize current knowledge/experience and Able to verbalize/acknowledge new learning      Endings: None Reported    Modes of Intervention: Education, Support, and Socialization      Discipline Responsible: Registered Nurse      Signature:  Christina Maldonado RN

## 2023-12-06 ENCOUNTER — FOLLOWUP TELEPHONE ENCOUNTER (OUTPATIENT)
Dept: PSYCHIATRY | Age: 23
End: 2023-12-06

## 2024-05-14 ENCOUNTER — HOSPITAL ENCOUNTER (EMERGENCY)
Age: 24
Discharge: HOME | End: 2024-05-14
Attending: EMERGENCY MEDICINE
Payer: MEDICAID

## 2024-05-14 VITALS
RESPIRATION RATE: 20 BRPM | SYSTOLIC BLOOD PRESSURE: 143 MMHG | OXYGEN SATURATION: 95 % | TEMPERATURE: 98.7 F | HEIGHT: 72 IN | WEIGHT: 230 LBS | DIASTOLIC BLOOD PRESSURE: 87 MMHG | BODY MASS INDEX: 31.15 KG/M2 | HEART RATE: 99 BPM

## 2024-05-14 DIAGNOSIS — K08.89 PAIN, DENTAL: Primary | ICD-10-CM

## 2024-05-14 DIAGNOSIS — R22.0 FACIAL SWELLING: ICD-10-CM

## 2024-05-14 PROCEDURE — 99284 EMERGENCY DEPT VISIT MOD MDM: CPT

## 2024-05-14 PROCEDURE — 6370000000 HC RX 637 (ALT 250 FOR IP): Performed by: EMERGENCY MEDICINE

## 2024-05-14 PROCEDURE — 6360000002 HC RX W HCPCS: Performed by: EMERGENCY MEDICINE

## 2024-05-14 PROCEDURE — 96372 THER/PROPH/DIAG INJ SC/IM: CPT

## 2024-05-14 RX ORDER — OXYCODONE HYDROCHLORIDE 5 MG/1
5 TABLET ORAL EVERY 6 HOURS PRN
Qty: 12 TABLET | Refills: 0 | Status: SHIPPED | OUTPATIENT
Start: 2024-05-14 | End: 2024-05-17

## 2024-05-14 RX ORDER — KETOROLAC TROMETHAMINE 30 MG/ML
30 INJECTION, SOLUTION INTRAMUSCULAR; INTRAVENOUS ONCE
Status: COMPLETED | OUTPATIENT
Start: 2024-05-14 | End: 2024-05-14

## 2024-05-14 RX ORDER — KETOROLAC TROMETHAMINE 10 MG/1
10 TABLET, FILM COATED ORAL EVERY 6 HOURS PRN
Qty: 20 TABLET | Refills: 0 | Status: SHIPPED | OUTPATIENT
Start: 2024-05-14 | End: 2025-05-14

## 2024-05-14 RX ORDER — AMOXICILLIN 500 MG/1
CAPSULE ORAL
COMMUNITY
Start: 2024-05-14

## 2024-05-14 RX ORDER — OXYCODONE HYDROCHLORIDE 5 MG/1
5 TABLET ORAL ONCE
Status: COMPLETED | OUTPATIENT
Start: 2024-05-14 | End: 2024-05-14

## 2024-05-14 RX ORDER — ACETAMINOPHEN 325 MG/1
650 TABLET ORAL EVERY 6 HOURS PRN
Qty: 80 TABLET | Refills: 0 | Status: SHIPPED | OUTPATIENT
Start: 2024-05-14 | End: 2024-05-24

## 2024-05-14 RX ORDER — LIDOCAINE HYDROCHLORIDE 20 MG/ML
15 SOLUTION OROPHARYNGEAL ONCE
Status: COMPLETED | OUTPATIENT
Start: 2024-05-14 | End: 2024-05-14

## 2024-05-14 RX ORDER — ACETAMINOPHEN 500 MG
1000 TABLET ORAL ONCE
Status: COMPLETED | OUTPATIENT
Start: 2024-05-14 | End: 2024-05-14

## 2024-05-14 RX ORDER — LIDOCAINE HYDROCHLORIDE 20 MG/ML
15 SOLUTION OROPHARYNGEAL PRN
Qty: 100 ML | Refills: 0 | Status: SHIPPED | OUTPATIENT
Start: 2024-05-14

## 2024-05-14 RX ADMIN — ACETAMINOPHEN 1000 MG: 500 TABLET ORAL at 17:55

## 2024-05-14 RX ADMIN — KETOROLAC TROMETHAMINE 30 MG: 30 INJECTION, SOLUTION INTRAMUSCULAR at 17:56

## 2024-05-14 RX ADMIN — LIDOCAINE HYDROCHLORIDE 15 ML: 20 SOLUTION ORAL at 18:22

## 2024-05-14 RX ADMIN — OXYCODONE 5 MG: 5 TABLET ORAL at 17:55

## 2024-05-14 ASSESSMENT — PAIN DESCRIPTION - ORIENTATION: ORIENTATION: RIGHT

## 2024-05-14 ASSESSMENT — PAIN SCALES - GENERAL: PAINLEVEL_OUTOF10: 10

## 2024-05-14 ASSESSMENT — PAIN - FUNCTIONAL ASSESSMENT: PAIN_FUNCTIONAL_ASSESSMENT: 0-10

## 2024-05-14 ASSESSMENT — PAIN DESCRIPTION - LOCATION: LOCATION: MOUTH

## 2024-05-14 NOTE — ED PROVIDER NOTES
[facility]  ED  EMERGENCY DEPARTMENT ENCOUNTER        Patient Name: Duong Pedraza  MRN: [id number]  Birthdate 2000  Date of evaluation: 5/14/2024  PCP: Jerry Sawant DO  Note Started: 5:53 PM EDT 5/14/24      CHIEF COMPLAINT   Dental Pain (Pt reports right side dental pain since Sunday. States overnight he began to swell. Went to his dentist and was placed on abx. Is scheduled for an extraction next Wednesday. Patient states his pain is high enough that he could cry. )         HISTORY & PHYSICAL     HISTORY OF PRESENT ILLNESS  History from : Patient    Limitations to history : None    Duong Pedraza is a 23 y.o. male  has a past medical history of ADHD (attention deficit hyperactivity disorder), Major depressive disorder, and Pervasive developmental disorder., who presents to the ED complaining of dental pain. On Sunday, patient began having extreme sharp pain in swelling on the right side of his mouth. Had a molar filling 6 months ago which has previously swollen like this before. He went to the dentist this morning for the swelling and pain, who prescribed amoxicillin for 7 days with follow-up next week for tooth extraction. Patient was in extreme pain and presented to the ED for pain management. No fever, endorses mild chills.       Old records reviewed:  no pertinent notes    No other complaints, modifying factors or associated symptoms.  Nursing Notes were all reviewed and agreed with or any disagreements were addressed in the HPI.    I have reviewed the following from the nursing documentation.    Past Medical History:   Diagnosis Date    ADHD (attention deficit hyperactivity disorder)     Major depressive disorder     Pervasive developmental disorder      Past Surgical History:   Procedure Laterality Date    PILONIDAL CYST EXCISION N/A 5/5/2022    EXCISION OF PILONIDAL CYST performed by Oc Stein MD at [facility] OR     Family History   Problem Relation Age of 
   1. Pain, dental  K08.89 oxyCODONE (ROXICODONE) 5 MG immediate release tablet      2. Facial swelling  R22.0 oxyCODONE (ROXICODONE) 5 MG immediate release tablet          I, Elza Benitez DO am the primary clinician of record. Although initial history information was obtained by the student physician as documented, I personally evaluated and managed the patient. I personally performed: supervision of the patient's care, physical exam, and the medical decision making. I agree with the documentation of the student physician above. We have discussed patient's care at length.     ELZA BENITEZ DO   [employer]        Elza Benitez DO  05/15/24 1600

## 2025-01-06 ENCOUNTER — HOSPITAL ENCOUNTER (EMERGENCY)
Age: 25
Discharge: HOME | End: 2025-01-06
Payer: COMMERCIAL

## 2025-01-06 VITALS
RESPIRATION RATE: 18 BRPM | TEMPERATURE: 98.3 F | HEART RATE: 77 BPM | OXYGEN SATURATION: 98 % | SYSTOLIC BLOOD PRESSURE: 135 MMHG | DIASTOLIC BLOOD PRESSURE: 94 MMHG

## 2025-01-06 DIAGNOSIS — L98.9 SKIN ABNORMALITY: Primary | ICD-10-CM

## 2025-01-06 PROCEDURE — 99283 EMERGENCY DEPT VISIT LOW MDM: CPT

## 2025-01-06 ASSESSMENT — ENCOUNTER SYMPTOMS: COLOR CHANGE: 1

## 2025-01-07 NOTE — ED PROVIDER NOTES
[facility] ED  EMERGENCY DEPARTMENT ENCOUNTER        Pt Name: Duong Pedraza  MRN: [id number]  Birthdate 2000  Date of evaluation: 1/6/2025  Provider: CAM Lopez - CNP  PCP: Jerry Sawant DO    This patient was not seen and evaluated by the attending physician No att. providers found.    I have evaluated this patient. My supervising physician was available for consultation.      CHIEF COMPLAINT       Chief Complaint   Patient presents with    Foot Injury     L foot pain, was sledding earlier and states it was blue after. Noticeable bruising to L side of foot, pedal pulses both noticed.        HISTORY OF PRESENT ILLNESS   (Location/Symptom, Timing/Onset, Context/Setting, Quality, Duration, Modifying Factors, Severity)  Note limiting factors.     History from : Patient  Duong Pedraza is a 24 y.o. male who presents via ems for blue discoloration to left foot.  Patient states that he was outside riding today with friends and when he got inside he noticed that his left boot had gotten wet and his sock was wet his right 1 was not.  It when he took off his socks and snow boots he noticed that his left foot was blue in color.  He denies any changes in sensation such as numbness or tingling.  He denies any injuries or pain.  He denies any difficulty with ambulation and denies any limits in range of motion.  He denies any pain to his foot ankle or leg.    Chart review reveals pt has significant PMHx of ADHD, depression, pervasive developmental disorder. They take Prozac, clonidine, Abilify.     Nursing Notes were all reviewed and agreed with or any disagreements were addressed  in the HPI.      REVIEW OF SYSTEMS    (2-9 systems for level 4, 10 or more for level 5)     Review of Systems   Musculoskeletal:  Negative for arthralgias and joint swelling.   Skin:  Positive for color change. Negative for pallor, rash and wound.       Positives and Pertinent negatives as per HPI.  Except as

## 2025-01-07 NOTE — ED NOTES
After scrubbing foot with alcohol swab, color on foot coming off. States he was wearing new pair of socks and maybe dye came from that

## 2025-04-06 ENCOUNTER — HOSPITAL ENCOUNTER (EMERGENCY)
Age: 25
Discharge: ANOTHER HOSPITAL | End: 2025-04-07
Attending: EMERGENCY MEDICINE
Payer: COMMERCIAL

## 2025-04-06 DIAGNOSIS — R45.851 SUICIDAL IDEATION: Primary | ICD-10-CM
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
## 2025-04-06 LAB
ALBUMIN SERPL-MCNC: 4.3 G/DL (ref 3.4–5)
ALBUMIN/GLOB SERPL: 1.7 {RATIO} (ref 1.1–2.2)
ALP SERPL-CCNC: 105 U/L (ref 40–129)
ALT SERPL-CCNC: 41 U/L (ref 10–40)
AMPHETAMINES UR QL SCN>1000 NG/ML: ABNORMAL
ANION GAP SERPL CALCULATED.3IONS-SCNC: 13 MMOL/L (ref 3–16)
APAP SERPL-MCNC: <5 UG/ML (ref 10–30)
AST SERPL-CCNC: 33 U/L (ref 15–37)
BACTERIA URNS QL MICRO: ABNORMAL /HPF
BARBITURATES UR QL SCN>200 NG/ML: ABNORMAL
BASOPHILS # BLD: 0.1 K/UL (ref 0–0.2)
BASOPHILS NFR BLD: 0.6 %
BENZODIAZ UR QL SCN>200 NG/ML: ABNORMAL
BILIRUB SERPL-MCNC: <0.2 MG/DL (ref 0–1)
BILIRUB UR QL STRIP.AUTO: NEGATIVE
BUN SERPL-MCNC: 14 MG/DL (ref 7–20)
CALCIUM SERPL-MCNC: 8.9 MG/DL (ref 8.3–10.6)
CANNABINOIDS UR QL SCN>50 NG/ML: POSITIVE
CHLORIDE SERPL-SCNC: 102 MMOL/L (ref 99–110)
CLARITY UR: CLEAR
CO2 SERPL-SCNC: 20 MMOL/L (ref 21–32)
COCAINE UR QL SCN: ABNORMAL
COLOR UR: YELLOW
CREAT SERPL-MCNC: 0.8 MG/DL (ref 0.9–1.3)
CRYSTALS URNS MICRO: ABNORMAL /HPF
DEPRECATED RDW RBC AUTO: 13.2 % (ref 12.4–15.4)
DRUG SCREEN COMMENT UR-IMP: ABNORMAL
EOSINOPHIL # BLD: 0.5 K/UL (ref 0–0.6)
EOSINOPHIL NFR BLD: 4.8 %
EPI CELLS #/AREA URNS HPF: ABNORMAL /HPF (ref 0–5)
ETHANOLAMINE SERPL-MCNC: NORMAL MG/DL (ref 0–0.08)
FENTANYL SCREEN, URINE: ABNORMAL
GFR SERPLBLD CREATININE-BSD FMLA CKD-EPI: >90 ML/MIN/{1.73_M2}
GLUCOSE SERPL-MCNC: 99 MG/DL (ref 70–99)
GLUCOSE UR STRIP.AUTO-MCNC: NEGATIVE MG/DL
HCT VFR BLD AUTO: 48.2 % (ref 40.5–52.5)
HGB BLD-MCNC: 16.8 G/DL (ref 13.5–17.5)
HGB UR QL STRIP.AUTO: NEGATIVE
KETONES UR STRIP.AUTO-MCNC: NEGATIVE MG/DL
LEUKOCYTE ESTERASE UR QL STRIP.AUTO: NEGATIVE
LYMPHOCYTES # BLD: 3.9 K/UL (ref 1–5.1)
LYMPHOCYTES NFR BLD: 36.5 %
MCH RBC QN AUTO: 31.1 PG (ref 26–34)
MCHC RBC AUTO-ENTMCNC: 34.8 G/DL (ref 31–36)
MCV RBC AUTO: 89.3 FL (ref 80–100)
METHADONE UR QL SCN>300 NG/ML: ABNORMAL
MONOCYTES # BLD: 0.5 K/UL (ref 0–1.3)
MONOCYTES NFR BLD: 4.8 %
MUCOUS THREADS #/AREA URNS LPF: ABNORMAL /LPF
NEUTROPHILS # BLD: 5.7 K/UL (ref 1.7–7.7)
NEUTROPHILS NFR BLD: 53.3 %
NITRITE UR QL STRIP.AUTO: NEGATIVE
OPIATES UR QL SCN>300 NG/ML: ABNORMAL
OXYCODONE UR QL SCN: ABNORMAL
PCP UR QL SCN>25 NG/ML: ABNORMAL
PH UR STRIP.AUTO: 6 [PH] (ref 5–8)
PH UR STRIP: 6 [PH]
PLATELET # BLD AUTO: 229 K/UL (ref 135–450)
PMV BLD AUTO: 9 FL (ref 5–10.5)
POTASSIUM SERPL-SCNC: 4.4 MMOL/L (ref 3.5–5.1)
PROT SERPL-MCNC: 6.9 G/DL (ref 6.4–8.2)
PROT UR STRIP.AUTO-MCNC: 30 MG/DL
RBC # BLD AUTO: 5.4 M/UL (ref 4.2–5.9)
RBC #/AREA URNS HPF: ABNORMAL /HPF (ref 0–4)
SALICYLATES SERPL-MCNC: <0.5 MG/DL (ref 15–30)
SODIUM SERPL-SCNC: 135 MMOL/L (ref 136–145)
SP GR UR STRIP.AUTO: 1.02 (ref 1–1.03)
UA COMPLETE W REFLEX CULTURE PNL UR: ABNORMAL
UA DIPSTICK W REFLEX MICRO PNL UR: YES
URN SPEC COLLECT METH UR: ABNORMAL
UROBILINOGEN UR STRIP-ACNC: 0.2 E.U./DL
WBC # BLD AUTO: 10.7 K/UL (ref 4–11)
WBC #/AREA URNS HPF: ABNORMAL /HPF (ref 0–5)

## 2025-04-06 PROCEDURE — 85025 COMPLETE CBC W/AUTO DIFF WBC: CPT

## 2025-04-06 PROCEDURE — 80179 DRUG ASSAY SALICYLATE: CPT

## 2025-04-06 PROCEDURE — 82077 ASSAY SPEC XCP UR&BREATH IA: CPT

## 2025-04-06 PROCEDURE — 80053 COMPREHEN METABOLIC PANEL: CPT

## 2025-04-06 PROCEDURE — 99283 EMERGENCY DEPT VISIT LOW MDM: CPT

## 2025-04-06 PROCEDURE — 80143 DRUG ASSAY ACETAMINOPHEN: CPT

## 2025-04-06 PROCEDURE — 80307 DRUG TEST PRSMV CHEM ANLYZR: CPT

## 2025-04-06 PROCEDURE — 81001 URINALYSIS AUTO W/SCOPE: CPT

## 2025-04-06 ASSESSMENT — PAIN DESCRIPTION - LOCATION: LOCATION: SHOULDER

## 2025-04-06 ASSESSMENT — PAIN DESCRIPTION - DESCRIPTORS: DESCRIPTORS: ACHING;SHARP

## 2025-04-06 ASSESSMENT — PAIN DESCRIPTION - ORIENTATION: ORIENTATION: LEFT

## 2025-04-06 ASSESSMENT — PAIN DESCRIPTION - PAIN TYPE: TYPE: ACUTE PAIN

## 2025-04-06 ASSESSMENT — PAIN - FUNCTIONAL ASSESSMENT: PAIN_FUNCTIONAL_ASSESSMENT: 0-10

## 2025-04-06 ASSESSMENT — PAIN SCALES - GENERAL: PAINLEVEL_OUTOF10: 5

## 2025-04-06 ASSESSMENT — PAIN DESCRIPTION - FREQUENCY: FREQUENCY: CONTINUOUS

## 2025-04-07 VITALS
BODY MASS INDEX: 32.51 KG/M2 | DIASTOLIC BLOOD PRESSURE: 70 MMHG | TEMPERATURE: 97.7 F | WEIGHT: 240 LBS | HEIGHT: 72 IN | RESPIRATION RATE: 16 BRPM | OXYGEN SATURATION: 96 % | HEART RATE: 85 BPM | SYSTOLIC BLOOD PRESSURE: 100 MMHG

## 2025-04-07 PROCEDURE — 6370000000 HC RX 637 (ALT 250 FOR IP): Performed by: EMERGENCY MEDICINE

## 2025-04-07 RX ORDER — CEPHALEXIN 500 MG/1
500 CAPSULE ORAL 2 TIMES DAILY
Qty: 14 CAPSULE | Refills: 0 | Status: SHIPPED | OUTPATIENT
Start: 2025-04-07 | End: 2025-04-14

## 2025-04-07 RX ORDER — CEPHALEXIN 500 MG/1
500 CAPSULE ORAL ONCE
Status: COMPLETED | OUTPATIENT
Start: 2025-04-07 | End: 2025-04-07

## 2025-04-07 RX ADMIN — CEPHALEXIN 500 MG: 500 CAPSULE ORAL at 03:42
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
## 2025-04-07 NOTE — ED NOTES
Pt transferred to [facility] on involuntary hold via ambulance, belongings retrieved from security and sent with EMS. Transfer paperwork, including involuntary hold form, sent with EMS crew.

## 2025-04-07 NOTE — VIRTUAL HEALTH
to deliver care in the state where the patient is located as indicated above. If you are not or unsure, please re-schedule the visit: Yes, I confirm.   [place] Consult to Tele-Psych  Consult performed by: Xavi Keith APRN - CNP  Consult ordered by: Brenda Gonzalez MD  Reason for consult: Suicidal/Risk to Self         Total time spent on this encounter: Not billed by time    --CAM Messer CNP on 4/7/2025 at 12:02 AM    An electronic signature was used to authenticate this note.

## 2025-05-23 NOTE — TELEPHONE ENCOUNTER
Patient notified that he can take a shower and to take Ibuprofen, patient agreed.
Pt called to make his 2 week post op with Dr. Charlie Jimenez. Pt had surgery on 05.05.22 for pilonidal cyst. Post op scheduled for 05.19.22. Pt asked about a refill on his meds and he wants to know when he can have a shower. Pt can be reached back at [phone].
36.7

## 2025-07-31 ENCOUNTER — APPOINTMENT (OUTPATIENT)
Dept: GENERAL RADIOLOGY | Age: 25
End: 2025-07-31
Payer: MEDICARE

## 2025-07-31 ENCOUNTER — HOSPITAL ENCOUNTER (EMERGENCY)
Age: 25
Discharge: HOME | End: 2025-07-31
Payer: MEDICARE

## 2025-07-31 VITALS
DIASTOLIC BLOOD PRESSURE: 80 MMHG | OXYGEN SATURATION: 100 % | SYSTOLIC BLOOD PRESSURE: 139 MMHG | WEIGHT: 243.4 LBS | HEART RATE: 63 BPM | BODY MASS INDEX: 32.97 KG/M2 | TEMPERATURE: 98.1 F | HEIGHT: 72 IN | RESPIRATION RATE: 18 BRPM

## 2025-07-31 DIAGNOSIS — S76.011A STRAIN OF RIGHT HIP ADDUCTOR MUSCLE, INITIAL ENCOUNTER: Primary | ICD-10-CM

## 2025-07-31 PROCEDURE — 73502 X-RAY EXAM HIP UNI 2-3 VIEWS: CPT

## 2025-07-31 PROCEDURE — 99283 EMERGENCY DEPT VISIT LOW MDM: CPT

## 2025-07-31 ASSESSMENT — PAIN SCALES - GENERAL: PAINLEVEL_OUTOF10: 9

## 2025-07-31 ASSESSMENT — LIFESTYLE VARIABLES
HOW MANY STANDARD DRINKS CONTAINING ALCOHOL DO YOU HAVE ON A TYPICAL DAY: PATIENT DOES NOT DRINK
HOW OFTEN DO YOU HAVE A DRINK CONTAINING ALCOHOL: NEVER

## 2025-07-31 ASSESSMENT — PAIN - FUNCTIONAL ASSESSMENT: PAIN_FUNCTIONAL_ASSESSMENT: 0-10

## 2025-08-01 ENCOUNTER — TELEPHONE (OUTPATIENT)
Dept: ORTHOPEDIC SURGERY | Age: 25
End: 2025-08-01

## 2025-08-01 NOTE — TELEPHONE ENCOUNTER
----- Message from Niki CASTRO sent at 8/1/2025  9:46 AM EDT -----  Regarding: Specialist Appointment Request - New  Specialist Appointment Request - New    Referral on File?: Yes/No: Yes    What Specialty? Select Speciality: Orthopedics     Reason for referral or appointment? HIP    Scheduling Preference per Patient: DR. EMELY MERCEDES    Additional Information: ER /XR  --------------------------------------------------------------------------------------------------------------------------    Relationship to Patient: ELI  Call Back Information: OK to leave message on voicemail  Preferred Call Back Number: [phone]

## 2025-08-01 NOTE — ED PROVIDER NOTES
[facility] EMERGENCY DEPARTMENT  EMERGENCY DEPARTMENT ENCOUNTER        Pt Name: Duong Pedraza  MRN: [id number]  Birthdate 2000  Date of evaluation: 7/31/2025  Provider: Jerry Quiros PA-C  PCP: Jerry Sawant DO  Note Started: 9:09 PM EDT 7/31/25      ARMIN. I have evaluated this patient.        CHIEF COMPLAINT       Chief Complaint   Patient presents with    Leg Pain     Pt. Reports R leg pain after twisting his leg while digging a hole on Tuesday       HISTORY OF PRESENT ILLNESS: 1 or more Elements     History From: Patient    Duong Pedraza is a 24 y.o. male who presents to the emergency department.  Patient helping a neighbor and taking whole.  He apparently jumped out of the hole causing a sudden abduction injury both hips.  Left is okay the right continued discomfort up in the groin area.  He comes in for that reason.  He said no medication such as ibuprofen or Tylenol.  No similar occurrence in the past.  He states standing and walking causes discomfort in the area.    Nursing Notes were all reviewed and agreed with or any disagreements were addressed in the HPI.    REVIEW OF SYSTEMS :      Review of Systems    Positives and Pertinent negatives as per HPI.     SURGICAL HISTORY     Past Surgical History:   Procedure Laterality Date    PILONIDAL CYST EXCISION N/A 5/5/2022    EXCISION OF PILONIDAL CYST performed by Oc Stein MD at [facility] OR       CURRENTMEDICATIONS       Current Discharge Medication List        CONTINUE these medications which have NOT CHANGED    Details   lidocaine viscous hcl (XYLOCAINE) 2 % SOLN solution Take 15 mLs by mouth as needed for Irritation  Qty: 100 mL, Refills: 0      FLUoxetine (PROZAC) 10 MG capsule Take 1 capsule by mouth daily      cloNIDine (CATAPRES) 0.1 MG tablet TAKE 1 TABLET BY MOUTH AT BEDTIME AS NEEDED FOR SLEEP      ARIPiprazole (ABILIFY) 2 MG tablet Take 1 tablet by mouth daily  Qty: 30 tablet, Refills: 0             ALLERGIES
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
## 2025-08-06 ENCOUNTER — OFFICE VISIT (OUTPATIENT)
Dept: ORTHOPEDIC SURGERY | Age: 25
End: 2025-08-06
Payer: MEDICARE

## 2025-08-06 VITALS — HEIGHT: 72 IN | WEIGHT: 240 LBS | BODY MASS INDEX: 32.51 KG/M2

## 2025-08-06 DIAGNOSIS — M54.50 LUMBAR PAIN: ICD-10-CM

## 2025-08-06 DIAGNOSIS — S76.011A HIP STRAIN, RIGHT, INITIAL ENCOUNTER: Primary | ICD-10-CM

## 2025-08-06 PROCEDURE — 99203 OFFICE O/P NEW LOW 30 MIN: CPT | Performed by: ORTHOPAEDIC SURGERY

## 2025-08-06 RX ORDER — METHYLPREDNISOLONE 4 MG/1
TABLET ORAL
Qty: 1 KIT | Refills: 0 | Status: SHIPPED | OUTPATIENT
Start: 2025-08-06 | End: 2025-08-12

## 2025-08-14 ENCOUNTER — TELEPHONE (OUTPATIENT)
Dept: ORTHOPEDIC SURGERY | Age: 25
End: 2025-08-14

## 2025-08-14 DIAGNOSIS — R10.31 GROIN PAIN, RIGHT: ICD-10-CM

## 2025-08-14 DIAGNOSIS — M54.16 LUMBAR RADICULITIS: Primary | ICD-10-CM

## 2025-08-19 ENCOUNTER — HOSPITAL ENCOUNTER (EMERGENCY)
Age: 25
Discharge: HOME | End: 2025-08-19
Payer: MEDICARE

## 2025-08-19 VITALS
HEART RATE: 79 BPM | WEIGHT: 247 LBS | OXYGEN SATURATION: 96 % | SYSTOLIC BLOOD PRESSURE: 145 MMHG | DIASTOLIC BLOOD PRESSURE: 74 MMHG | BODY MASS INDEX: 33.46 KG/M2 | HEIGHT: 72 IN | TEMPERATURE: 98.4 F | RESPIRATION RATE: 18 BRPM

## 2025-08-19 DIAGNOSIS — S39.012A STRAIN OF LUMBAR REGION, INITIAL ENCOUNTER: Primary | ICD-10-CM

## 2025-08-19 PROCEDURE — 99283 EMERGENCY DEPT VISIT LOW MDM: CPT

## 2025-08-19 PROCEDURE — 6370000000 HC RX 637 (ALT 250 FOR IP): Performed by: PHYSICIAN ASSISTANT

## 2025-08-19 RX ORDER — PREDNISONE 20 MG/1
60 TABLET ORAL ONCE
Status: COMPLETED | OUTPATIENT
Start: 2025-08-19 | End: 2025-08-19

## 2025-08-19 RX ORDER — METHOCARBAMOL 750 MG/1
750 TABLET, FILM COATED ORAL 4 TIMES DAILY
Qty: 40 TABLET | Refills: 0 | Status: SHIPPED | OUTPATIENT
Start: 2025-08-19 | End: 2025-08-29

## 2025-08-19 RX ORDER — NAPROXEN 250 MG/1
500 TABLET ORAL 2 TIMES DAILY WITH MEALS
Status: DISCONTINUED | OUTPATIENT
Start: 2025-08-19 | End: 2025-08-19 | Stop reason: HOSPADM

## 2025-08-19 RX ORDER — METHOCARBAMOL 750 MG/1
750 TABLET, FILM COATED ORAL ONCE
Status: COMPLETED | OUTPATIENT
Start: 2025-08-19 | End: 2025-08-19

## 2025-08-19 RX ORDER — NAPROXEN 500 MG/1
500 TABLET ORAL 2 TIMES DAILY WITH MEALS
Qty: 60 TABLET | Refills: 5 | Status: SHIPPED | OUTPATIENT
Start: 2025-08-19

## 2025-08-19 RX ORDER — PREDNISONE 20 MG/1
20 TABLET ORAL 2 TIMES DAILY
Qty: 10 TABLET | Refills: 0 | Status: SHIPPED | OUTPATIENT
Start: 2025-08-19 | End: 2025-08-24

## 2025-08-19 RX ADMIN — PREDNISONE 60 MG: 20 TABLET ORAL at 17:15

## 2025-08-19 RX ADMIN — METHOCARBAMOL 750 MG: 750 TABLET ORAL at 17:15

## 2025-08-19 RX ADMIN — NAPROXEN 500 MG: 250 TABLET ORAL at 17:19

## 2025-08-19 ASSESSMENT — PAIN - FUNCTIONAL ASSESSMENT
PAIN_FUNCTIONAL_ASSESSMENT: 0-10

## 2025-08-19 ASSESSMENT — PAIN DESCRIPTION - LOCATION: LOCATION: BACK

## 2025-08-19 ASSESSMENT — PAIN SCALES - GENERAL: PAINLEVEL_OUTOF10: 9

## 2025-08-19 ASSESSMENT — PAIN DESCRIPTION - DESCRIPTORS: DESCRIPTORS: TIGHTNESS

## 2025-08-19 ASSESSMENT — PAIN DESCRIPTION - ORIENTATION: ORIENTATION: MID

## 2025-08-27 ENCOUNTER — HOSPITAL ENCOUNTER (OUTPATIENT)
Dept: MRI IMAGING | Age: 25
Discharge: HOME | End: 2025-08-27
Attending: ORTHOPAEDIC SURGERY
Payer: MEDICARE

## 2025-08-27 DIAGNOSIS — M54.16 LUMBAR RADICULITIS: ICD-10-CM

## 2025-08-27 DIAGNOSIS — R10.31 GROIN PAIN, RIGHT: ICD-10-CM

## 2025-08-27 PROCEDURE — 72148 MRI LUMBAR SPINE W/O DYE: CPT

## 2025-08-27 PROCEDURE — 73721 MRI JNT OF LWR EXTRE W/O DYE: CPT

## 2025-08-28 ENCOUNTER — RESULTS FOLLOW-UP (OUTPATIENT)
Dept: ORTHOPEDIC SURGERY | Age: 25
End: 2025-08-28

## 2025-08-28 DIAGNOSIS — M54.16 LUMBAR RADICULITIS: Primary | ICD-10-CM

## (undated) DEVICE — PENCIL ES L3M BTTN SWCH S STL HEX LOK BLDE ELECTRD HOLSTER

## (undated) DEVICE — SUTURE ETHLN SZ 3-0 L18IN NONABSORBABLE BLK L24MM PS-1 3/8 1663G

## (undated) DEVICE — GOWN,SIRUS,NON REINFRCD,LARGE,SET IN SL: Brand: MEDLINE

## (undated) DEVICE — SUTURE VCRL SZ 3-0 L18IN ABSRB UD L26MM SH 1/2 CIR J864D

## (undated) DEVICE — SYRINGE IRRIG 60ML SFT PLIABLE BLB EZ TO GRP 1 HND USE W/

## (undated) DEVICE — GLOVE SURG SZ 65 L12IN FNGR THK79MIL GRN LTX FREE

## (undated) DEVICE — SET GRAV VENT NVENT CK VLV 3 NDL FREE PRT 10 GTT

## (undated) DEVICE — SOLUTION IV 1000ML LAC RINGERS PH 6.5 INJ USP VIAFLX PLAS

## (undated) DEVICE — MINOR SET UP PACK: Brand: MEDLINE INDUSTRIES, INC.

## (undated) DEVICE — SUTURE NONABSORBABLE MONOFILAMENT 3-0 PS-1 18 IN BLK ETHILON 1663H

## (undated) DEVICE — Device: Brand: PILLOW, GENTLETOUCH, 7 INCH RT

## (undated) DEVICE — SURGICAL PROCEDURE PACK IV U-BAR

## (undated) DEVICE — GLOVE,SURG,SENSICARE,ALOE,LF,PF,7: Brand: MEDLINE

## (undated) DEVICE — CATHETER IV 20GA L1.25IN PNK FEP SFTY STR HUB RADPQ DISP

## (undated) DEVICE — STANDARD HYPODERMIC NEEDLE,POLYPROPYLENE HUB: Brand: MONOJECT

## (undated) DEVICE — ELECTRODE PT RET AD L9FT HI MOIST COND ADH HYDRGEL CORDED

## (undated) DEVICE — TUBING, SUCTION, 3/16" X 12', STRAIGHT: Brand: MEDLINE

## (undated) DEVICE — SOLUTION IV IRRIG POUR BRL 0.9% SODIUM CHL 2F7124

## (undated) DEVICE — SYRINGE MED 10ML LUERLOCK TIP W/O SFTY DISP

## (undated) DEVICE — LINER SUCT CANSTR 3000CC PLAS SFT PRE ASSEMB W/OUT TBNG W/

## (undated) DEVICE — PAD,ABDOMINAL,8"X10",ST,LF: Brand: MEDLINE

## (undated) DEVICE — CRADLE POS PRONE 24 X 5 X 3 IN ARM N COMPR NO CVR FOAM DISP

## (undated) DEVICE — SET ADMIN PRIMING 7ML L30IN 7.35LB 20 GTT 2ND RLER CLMP

## (undated) DEVICE — GAUZE,SPONGE,4"X4",16PLY,STRL,LF,10/TRAY: Brand: MEDLINE

## (undated) DEVICE — 3M™ TEGADERM™ TRANSPARENT FILM DRESSING FRAME STYLE, 1624W, 2-3/8 IN X 2-3/4 IN (6 CM X 7 CM), 100/CT 4CT/CASE: Brand: 3M™ TEGADERM™